# Patient Record
Sex: MALE | Race: WHITE | Employment: FULL TIME | URBAN - METROPOLITAN AREA
[De-identification: names, ages, dates, MRNs, and addresses within clinical notes are randomized per-mention and may not be internally consistent; named-entity substitution may affect disease eponyms.]

---

## 2017-12-12 LAB
CREATININE, EXTERNAL: 0.94
HBA1C MFR BLD HPLC: 5.1 %
LDL-C, EXTERNAL: 156

## 2018-02-01 ENCOUNTER — OFFICE VISIT (OUTPATIENT)
Dept: INTERNAL MEDICINE CLINIC | Age: 53
End: 2018-02-01

## 2018-02-01 VITALS
DIASTOLIC BLOOD PRESSURE: 74 MMHG | RESPIRATION RATE: 12 BRPM | BODY MASS INDEX: 26.94 KG/M2 | WEIGHT: 188.2 LBS | HEIGHT: 70 IN | HEART RATE: 60 BPM | TEMPERATURE: 98.5 F | OXYGEN SATURATION: 98 % | SYSTOLIC BLOOD PRESSURE: 138 MMHG

## 2018-02-01 DIAGNOSIS — E78.2 MIXED HYPERLIPIDEMIA: ICD-10-CM

## 2018-02-01 DIAGNOSIS — J30.1 CHRONIC SEASONAL ALLERGIC RHINITIS DUE TO POLLEN: ICD-10-CM

## 2018-02-01 DIAGNOSIS — Z00.00 ROUTINE GENERAL MEDICAL EXAMINATION AT A HEALTH CARE FACILITY: Primary | ICD-10-CM

## 2018-02-01 RX ORDER — LANOLIN ALCOHOL/MO/W.PET/CERES
400 CREAM (GRAM) TOPICAL DAILY
COMMUNITY
Start: 2018-02-01 | End: 2019-12-06 | Stop reason: ALTCHOICE

## 2018-02-01 RX ORDER — SIMVASTATIN 20 MG/1
20 TABLET, FILM COATED ORAL
Qty: 90 TAB | Refills: 3 | Status: SHIPPED | OUTPATIENT
Start: 2018-02-01 | End: 2019-01-22 | Stop reason: SDUPTHER

## 2018-02-01 NOTE — MR AVS SNAPSHOT
[address] 
[phone] Patient: Tonya Conway MRN:  [id number] Visit Information Date & Time Provider Department Dept. Phone Encounter #  
 2018  2:30 PM Abundio Chinchilla MD [facility] [phone] [id number] Follow-up Instructions Return in about 6 months (around 2018). Upcoming Health Maintenance Date Due Hepatitis C Screening 1965 DTaP/Tdap/Td series (2 - Td) 2019 COLONOSCOPY 2024 Allergies as of 2018  Review Complete On: 2016 By: Abundio Chinchilla MD  
 No Known Allergies Current Immunizations  Reviewed on 2018 Name Date Influenza Vaccine 10/1/2017 TDAP Vaccine 2009 Reviewed by Abundio Chinchilla MD on 2018 at  3:11 PM  
You Were Diagnosed With   
  
 Codes Comments Routine general medical examination at a health care facility    -  Primary ICD-10-CM: Z00.00 ICD-9-CM: V70.0 Mixed hyperlipidemia     ICD-10-CM: E78.2 ICD-9-CM: 272.2 Chronic seasonal allergic rhinitis due to pollen     ICD-10-CM: J30.1 ICD-9-CM: 477.0 Vitals BP Pulse Temp Resp Height(growth percentile) Weight(growth percentile) 138/74 60 98.5 °F (36.9 °C) (Oral) 12 5' 10\" (1.778 m) 188 lb 3.2 oz (85.4 kg) SpO2 BMI Smoking Status 98% 27 kg/m2 Never Smoker Vitals History BMI and BSA Data Body Mass Index Body Surface Area  
 27 kg/m 2 2.05 m 2 Preferred Pharmacy Pharmacy Name Phone [id number] [address] [phone] Your Updated Medication List  
  
   
This list is accurate as of: 18  3:29 PM.  Always use your most recent med list.  
  
  
  
  
 ibuprofen 200 mg tablet Commonly known as:  MOTRIN Take  by mouth every six (6) hours as needed. magnesium oxide 400 mg tablet Commonly known as:  MAG-OX Take 1 Tab by mouth daily. simvastatin 20 mg tablet Commonly known as:  ZOCOR Take 1 Tab by mouth nightly. varicella-zoster recombinant (PF) 50 mcg/0.5 mL Susr injection Commonly known as:  SHINGRIX  
0.5 mL by IntraMUSCular route once for 1 dose. ZyrTEC 10 mg tablet Generic drug:  cetirizine Take  by mouth daily as needed. Prescriptions Printed Refills  
 varicella-zoster recombinant, PF, (SHINGRIX) 50 mcg/0.5 mL susr injection 1 Si.5 mL by IntraMUSCular route once for 1 dose. Class: Print Route: IntraMUSCular Prescriptions Sent to Pharmacy Refills  
 simvastatin (ZOCOR) 20 mg tablet 3 Sig: Take 1 Tab by mouth nightly. Class: Normal  
 Pharmacy: [id number], [address] [place], [place] [address]nicholas  #: [phone] Route: Oral  
  
We Performed the Following PSA TOTAL (REFLEX TO FREE) [90865 CPT(R)] UA/M W/RFLX CULTURE, ROUTINE [[id number] Custom] Follow-up Instructions Return in about 6 months (around 2018). Patient Instructions DASH Diet: Care Instructions Your Care Instructions The DASH diet is an eating plan that can help lower your blood pressure. DASH stands for Dietary Approaches to Stop Hypertension. Hypertension is high blood pressure. The DASH diet focuses on eating foods that are high in calcium, potassium, and magnesium. These nutrients can lower blood pressure. The foods that are highest in these nutrients are fruits, vegetables, low-fat dairy products, nuts, seeds, and legumes. But taking calcium, potassium, and magnesium supplements instead of eating foods that are high in those nutrients does not have the same effect. The DASH diet also includes whole grains, fish, and poultry.  
The DASH diet is one of several lifestyle changes your doctor may recommend to lower your high blood pressure. Your doctor may also want you to decrease the amount of sodium in your diet. Lowering sodium while following the DASH diet can lower blood pressure even further than just the DASH diet alone. Follow-up care is a key part of your treatment and safety. Be sure to make and go to all appointments, and call your doctor if you are having problems. It's also a good idea to know your test results and keep a list of the medicines you take. How can you care for yourself at home? Following the DASH diet · Eat 4 to 5 servings of fruit each day. A serving is 1 medium-sized piece of fruit, ½ cup chopped or canned fruit, 1/4 cup dried fruit, or 4 ounces (½ cup) of fruit juice. Choose fruit more often than fruit juice. · Eat 4 to 5 servings of vegetables each day. A serving is 1 cup of lettuce or raw leafy vegetables, ½ cup of chopped or cooked vegetables, or 4 ounces (½ cup) of vegetable juice. Choose vegetables more often than vegetable juice. · Get 2 to 3 servings of low-fat and fat-free dairy each day. A serving is 8 ounces of milk, 1 cup of yogurt, or 1 ½ ounces of cheese. · Eat 6 to 8 servings of grains each day. A serving is 1 slice of bread, 1 ounce of dry cereal, or ½ cup of cooked rice, pasta, or cooked cereal. Try to choose whole-grain products as much as possible. · Limit lean meat, poultry, and fish to 2 servings each day. A serving is 3 ounces, about the size of a deck of cards. · Eat 4 to 5 servings of nuts, seeds, and legumes (cooked dried beans, lentils, and split peas) each week. A serving is 1/3 cup of nuts, 2 tablespoons of seeds, or ½ cup of cooked beans or peas. · Limit fats and oils to 2 to 3 servings each day. A serving is 1 teaspoon of vegetable oil or 2 tablespoons of salad dressing. · Limit sweets and added sugars to 5 servings or less a week. A serving is 1 tablespoon jelly or jam, ½ cup sorbet, or 1 cup of lemonade. · Eat less than 2,300 milligrams (mg) of sodium a day. If you limit your sodium to 1,500 mg a day, you can lower your blood pressure even more. Tips for success · Start small. Do not try to make dramatic changes to your diet all at once. You might feel that you are missing out on your favorite foods and then be more likely to not follow the plan. Make small changes, and stick with them. Once those changes become habit, add a few more changes. · Try some of the following: ¨ Make it a goal to eat a fruit or vegetable at every meal and at snacks. This will make it easy to get the recommended amount of fruits and vegetables each day. ¨ Try yogurt topped with fruit and nuts for a snack or healthy dessert. ¨ Add lettuce, tomato, cucumber, and onion to sandwiches. ¨ Combine a ready-made pizza crust with low-fat mozzarella cheese and lots of vegetable toppings. Try using tomatoes, squash, spinach, broccoli, carrots, cauliflower, and onions. ¨ Have a variety of cut-up vegetables with a low-fat dip as an appetizer instead of chips and dip. ¨ Sprinkle sunflower seeds or chopped almonds over salads. Or try adding chopped walnuts or almonds to cooked vegetables. ¨ Try some vegetarian meals using beans and peas. Add garbanzo or kidney beans to salads. Make burritos and tacos with mashed allen beans or black beans. Where can you learn more? Go to http://melanie-jonathan.info/. Enter W457 in the search box to learn more about \"DASH Diet: Care Instructions. \" Current as of: September 21, 2016 Content Version: 11.4 © 9844-2894 [employer]. Care instructions adapted under license by [employer] (which disclaims liability or warranty for this information). If you have questions about a medical condition or this instruction, always ask your healthcare professional. [address] any warranty or liability for your use of this information. Piriformis Syndrome: Exercises Your Care Instructions Here are some examples of typical rehabilitation exercises for your condition. Start each exercise slowly. Ease off the exercise if you start to have pain. Your doctor or physical therapist will tell you when you can start these exercises and which ones will work best for you. How to do the exercises Hip rotator stretch 1. Lie on your back with both knees bent and your feet flat on the floor. 2. Put the ankle of your affected leg on your opposite thigh near your knee. 3. Use your hand to gently push your knee (on your affected leg) away from your body until you feel a gentle stretch around your hip. 4. Hold the stretch for 15 to 30 seconds. 5. Repeat 2 to 4 times. 6. Switch legs and repeat steps 1 through 5. Piriformis stretch 1. Lie on your back with your legs straight. 2. Lift your affected leg and bend your knee. With your opposite hand, reach across your body, and then gently pull your knee toward your opposite shoulder. 3. Hold the stretch for 15 to 30 seconds. 4. Repeat with your other leg. 5. Repeat 2 to 4 times on each side. Lower abdominal strengthening 1. Lie on your back with your knees bent and your feet flat on the floor. 2. Tighten your belly muscles by pulling your belly button in toward your spine. 3. Lift one foot off the floor and bring your knee toward your chest, so that your knee is straight above your hip and your leg is bent like the letter \"L. \" 
4. Lift the other knee up to the same position. 5. Lower one leg at a time to the starting position. 6. Keep alternating legs until you have lifted each leg 8 to 12 times. 7. Be sure to keep your belly muscles tight and your back still as you are moving your legs. Be sure to breathe normally. Follow-up care is a key part of your treatment and safety.  Be sure to make and go to all appointments, and call your doctor if you are having problems. It's also a good idea to know your test results and keep a list of the medicines you take. Where can you learn more? Go to http://melanie-jonathan.info/. Enter N624 in the search box to learn more about \"Piriformis Syndrome: Exercises. \" Current as of: March 21, 2017 Content Version: 11.4 © 5589-5228 [employer]. Care instructions adapted under license by [employer] (which disclaims liability or warranty for this information). If you have questions about a medical condition or this instruction, always ask your healthcare professional. [address] any warranty or liability for your use of this information. Introducing [facility]! Dear Toma Rodriguez: Thank you for requesting a The Hunt account. Our records indicate that you already have an active The Hunt account. You can access your account anytime at [email or web address] Did you know that you can access your hospital and ER discharge instructions at any time in The Hunt? You can also review all of your test results from your hospital stay or ER visit. Additional Information If you have questions, please visit the Frequently Asked Questions section of the The Hunt website at [email or web address] Remember, The Hunt is NOT to be used for urgent needs. For medical emergencies, dial 911. Now available from your iPhone and Android! Please provide this summary of care documentation to your next provider. Your primary care clinician is listed as Richelle [phone] If you have any questions after today's visit, please call [phone].

## 2018-02-01 NOTE — PATIENT INSTRUCTIONS
DASH Diet: Care Instructions  Your Care Instructions    The DASH diet is an eating plan that can help lower your blood pressure. DASH stands for Dietary Approaches to Stop Hypertension. Hypertension is high blood pressure. The DASH diet focuses on eating foods that are high in calcium, potassium, and magnesium. These nutrients can lower blood pressure. The foods that are highest in these nutrients are fruits, vegetables, low-fat dairy products, nuts, seeds, and legumes. But taking calcium, potassium, and magnesium supplements instead of eating foods that are high in those nutrients does not have the same effect. The DASH diet also includes whole grains, fish, and poultry. The DASH diet is one of several lifestyle changes your doctor may recommend to lower your high blood pressure. Your doctor may also want you to decrease the amount of sodium in your diet. Lowering sodium while following the DASH diet can lower blood pressure even further than just the DASH diet alone. Follow-up care is a key part of your treatment and safety. Be sure to make and go to all appointments, and call your doctor if you are having problems. It's also a good idea to know your test results and keep a list of the medicines you take. How can you care for yourself at home? Following the DASH diet  · Eat 4 to 5 servings of fruit each day. A serving is 1 medium-sized piece of fruit, ½ cup chopped or canned fruit, 1/4 cup dried fruit, or 4 ounces (½ cup) of fruit juice. Choose fruit more often than fruit juice. · Eat 4 to 5 servings of vegetables each day. A serving is 1 cup of lettuce or raw leafy vegetables, ½ cup of chopped or cooked vegetables, or 4 ounces (½ cup) of vegetable juice. Choose vegetables more often than vegetable juice. · Get 2 to 3 servings of low-fat and fat-free dairy each day. A serving is 8 ounces of milk, 1 cup of yogurt, or 1 ½ ounces of cheese. · Eat 6 to 8 servings of grains each day.  A serving is 1 slice of bread, 1 ounce of dry cereal, or ½ cup of cooked rice, pasta, or cooked cereal. Try to choose whole-grain products as much as possible. · Limit lean meat, poultry, and fish to 2 servings each day. A serving is 3 ounces, about the size of a deck of cards. · Eat 4 to 5 servings of nuts, seeds, and legumes (cooked dried beans, lentils, and split peas) each week. A serving is 1/3 cup of nuts, 2 tablespoons of seeds, or ½ cup of cooked beans or peas. · Limit fats and oils to 2 to 3 servings each day. A serving is 1 teaspoon of vegetable oil or 2 tablespoons of salad dressing. · Limit sweets and added sugars to 5 servings or less a week. A serving is 1 tablespoon jelly or jam, ½ cup sorbet, or 1 cup of lemonade. · Eat less than 2,300 milligrams (mg) of sodium a day. If you limit your sodium to 1,500 mg a day, you can lower your blood pressure even more. Tips for success  · Start small. Do not try to make dramatic changes to your diet all at once. You might feel that you are missing out on your favorite foods and then be more likely to not follow the plan. Make small changes, and stick with them. Once those changes become habit, add a few more changes. · Try some of the following:  ¨ Make it a goal to eat a fruit or vegetable at every meal and at snacks. This will make it easy to get the recommended amount of fruits and vegetables each day. ¨ Try yogurt topped with fruit and nuts for a snack or healthy dessert. ¨ Add lettuce, tomato, cucumber, and onion to sandwiches. ¨ Combine a ready-made pizza crust with low-fat mozzarella cheese and lots of vegetable toppings. Try using tomatoes, squash, spinach, broccoli, carrots, cauliflower, and onions. ¨ Have a variety of cut-up vegetables with a low-fat dip as an appetizer instead of chips and dip. ¨ Sprinkle sunflower seeds or chopped almonds over salads. Or try adding chopped walnuts or almonds to cooked vegetables.   ¨ Try some vegetarian meals using beans and peas. Add garbanzo or kidney beans to salads. Make burritos and tacos with mashed allen beans or black beans. Where can you learn more? Go to http://melanie-jonathan.info/. Enter Y734 in the search box to learn more about \"DASH Diet: Care Instructions. \"  Current as of: September 21, 2016  Content Version: 11.4  © 6863-9652 [employer]. Care instructions adapted under license by [employer] (which disclaims liability or warranty for this information). If you have questions about a medical condition or this instruction, always ask your healthcare professional. [place] [address] any warranty or liability for your use of this information. Piriformis Syndrome: Exercises  Your Care Instructions  Here are some examples of typical rehabilitation exercises for your condition. Start each exercise slowly. Ease off the exercise if you start to have pain. Your doctor or physical therapist will tell you when you can start these exercises and which ones will work best for you. How to do the exercises  Hip rotator stretch    1. Lie on your back with both knees bent and your feet flat on the floor. 2. Put the ankle of your affected leg on your opposite thigh near your knee. 3. Use your hand to gently push your knee (on your affected leg) away from your body until you feel a gentle stretch around your hip. 4. Hold the stretch for 15 to 30 seconds. 5. Repeat 2 to 4 times. 6. Switch legs and repeat steps 1 through 5. Piriformis stretch    1. Lie on your back with your legs straight. 2. Lift your affected leg and bend your knee. With your opposite hand, reach across your body, and then gently pull your knee toward your opposite shoulder. 3. Hold the stretch for 15 to 30 seconds. 4. Repeat with your other leg. 5. Repeat 2 to 4 times on each side. Lower abdominal strengthening    1.  Lie on your back with your knees bent and your feet flat on the floor.  2. Tighten your belly muscles by pulling your belly button in toward your spine. 3. Lift one foot off the floor and bring your knee toward your chest, so that your knee is straight above your hip and your leg is bent like the letter \"L. \"  4. Lift the other knee up to the same position. 5. Lower one leg at a time to the starting position. 6. Keep alternating legs until you have lifted each leg 8 to 12 times. 7. Be sure to keep your belly muscles tight and your back still as you are moving your legs. Be sure to breathe normally. Follow-up care is a key part of your treatment and safety. Be sure to make and go to all appointments, and call your doctor if you are having problems. It's also a good idea to know your test results and keep a list of the medicines you take. Where can you learn more? Go to http://melanie-jonathan.info/. Enter T539 in the search box to learn more about \"Piriformis Syndrome: Exercises. \"  Current as of: March 21, 2017  Content Version: 11.4  © 3446-9099 [employer]. Care instructions adapted under license by [employer] (which disclaims liability or warranty for this information). If you have questions about a medical condition or this instruction, always ask your healthcare professional. [address] any warranty or liability for your use of this information.

## 2018-02-02 LAB
APPEARANCE UR: CLEAR
BACTERIA #/AREA URNS HPF: NORMAL /[HPF]
BILIRUB UR QL STRIP: NEGATIVE
CASTS URNS QL MICRO: NORMAL /LPF
COLOR UR: YELLOW
EPI CELLS #/AREA URNS HPF: NORMAL /HPF
GLUCOSE UR QL: NEGATIVE
HGB UR QL STRIP: NEGATIVE
KETONES UR QL STRIP: NEGATIVE
LEUKOCYTE ESTERASE UR QL STRIP: NEGATIVE
MICRO URNS: NORMAL
MICRO URNS: NORMAL
MUCOUS THREADS URNS QL MICRO: PRESENT
NITRITE UR QL STRIP: NEGATIVE
PH UR STRIP: 6.5 [PH] (ref 5–7.5)
PROT UR QL STRIP: NEGATIVE
PSA SERPL-MCNC: 3.6 NG/ML (ref 0–4)
RBC #/AREA URNS HPF: NORMAL /HPF
SP GR UR: 1.02 (ref 1–1.03)
URINALYSIS REFLEX, 377202: NORMAL
UROBILINOGEN UR STRIP-MCNC: 0.2 MG/DL (ref 0.2–1)
WBC #/AREA URNS HPF: NORMAL /HPF

## 2018-02-02 NOTE — PROGRESS NOTES
Subjective:     Vick Hernandez is a 45 y.o. male presenting for annual exam and complete physical.    Patient Active Problem List    Diagnosis Date Noted    Advance directive discussed with patient 05/26/2016    Mixed hyperlipidemia 01/25/2010    Allergic rhinitis due to pollen      Current Outpatient Prescriptions   Medication Sig Dispense Refill    magnesium oxide (MAG-OX) 400 mg tablet Take 1 Tab by mouth daily.  varicella-zoster recombinant, PF, (SHINGRIX) 50 mcg/0.5 mL susr injection 0.5 mL by IntraMUSCular route once for 1 dose. 0.5 mL 1    simvastatin (ZOCOR) 20 mg tablet Take 1 Tab by mouth nightly. 90 Tab 3    cetirizine (ZYRTEC) 10 mg tablet Take  by mouth daily as needed.  ibuprofen (MOTRIN) 200 mg tablet Take  by mouth every six (6) hours as needed.        No Known Allergies  Past Medical History:   Diagnosis Date    Allergic rhinitis, cause unspecified     Hives     Mixed hyperlipidemia 1/25/2010     Past Surgical History:   Procedure Laterality Date    HX HEENT      HX VASECTOMY      HX WISDOM TEETH EXTRACTION       Family History   Problem Relation Age of Onset    Elevated Lipids Father     Cancer Paternal Grandmother      brain    Heart Disease Paternal Grandfather     Alcohol abuse Brother     Stroke Brother     Seizures Brother     Alcohol abuse Sister     Other Sister      Social History   Substance Use Topics    Smoking status: Never Smoker    Smokeless tobacco: Never Used    Alcohol use 1.8 oz/week     3 Glasses of wine per week        Lab Results   Component Value Date/Time    WBC 5.9 05/26/2016 09:26 AM    HGB 15.6 05/26/2016 09:26 AM    HCT 46.7 05/26/2016 09:26 AM    PLATELET 731 24/02/3296 09:26 AM    MCV 92 05/26/2016 09:26 AM     Lab Results   Component Value Date/Time    Cholesterol, total 212 05/26/2016 09:26 AM    HDL Cholesterol 55 05/26/2016 09:26 AM    LDL, calculated 145 05/26/2016 09:26 AM    LDL-C, External 156 12/12/2017    Triglyceride 58 05/26/2016 09:26 AM    CHOL/HDL Ratio 3.0 01/25/2010 09:54 AM     Lab Results   Component Value Date/Time    ALT (SGPT) 23 05/26/2016 09:26 AM    AST (SGOT) 18 05/26/2016 09:26 AM    Alk. phosphatase 38 05/26/2016 09:26 AM    Bilirubin, total 1.3 05/26/2016 09:26 AM    Albumin 4.2 05/26/2016 09:26 AM    Protein, total 6.6 05/26/2016 09:26 AM    PLATELET 155 80/98/0221 09:26 AM       Lab Results   Component Value Date/Time    GFR est non-AA 79 05/26/2016 09:26 AM    GFR est AA 92 05/26/2016 09:26 AM    Creatinine 1.07 05/26/2016 09:26 AM    BUN 16 05/26/2016 09:26 AM    Sodium 139 05/26/2016 09:26 AM    Potassium 4.8 05/26/2016 09:26 AM    Chloride 100 05/26/2016 09:26 AM    CO2 25 05/26/2016 09:26 AM     Lab Results   Component Value Date/Time    Prostate Specific Ag 3.3 05/26/2016 09:26 AM    Prostate Specific Ag 2.1 05/21/2015 09:13 AM    Prostate Specific Ag 1.9 05/09/2014 09:02 AM    Prostate Specific Ag 1.2 01/25/2010 09:54 AM    Prostate Specific Ag 0.9 01/23/2009 10:47 AM     Lab Results   Component Value Date/Time    TSH 0.967 05/26/2016 09:26 AM      Lab Results   Component Value Date/Time    Glucose 85 05/26/2016 09:26 AM         Review of Systems  A comprehensive review of systems was negative except for: Piriformis syndrome pain in the right buttocks intermittently.     Objective:     Visit Vitals    /74    Pulse 60    Temp 98.5 °F (36.9 °C) (Oral)    Resp 12    Ht 5' 10\" (1.778 m)    Wt 188 lb 3.2 oz (85.4 kg)    SpO2 98%    BMI 27 kg/m2     Physical exam:   General appearance - alert, well appearing, and in no distress  Eyes - pupils equal and reactive, extraocular eye movements intact  Ears - bilateral TM's and external ear canals normal  Nose - normal and patent, no erythema, discharge or polyps  Mouth - mucous membranes moist, pharynx normal without lesions  Neck - supple, no significant adenopathy  Lymphatics - no palpable lymphadenopathy, no hepatosplenomegaly  Chest - clear to auscultation, no wheezes, rales or rhonchi, symmetric air entry  Heart - normal rate, regular rhythm, normal S1, S2, no murmurs, rubs, clicks or gallops  Abdomen - soft, nontender, nondistended, no masses or organomegaly  Rectal - deferred, not clinically indicated  Back exam - full range of motion, no tenderness, palpable spasm or pain on motion  Neurological - alert, oriented, normal speech, no focal findings or movement disorder noted  Musculoskeletal - no joint tenderness, deformity or swelling  Extremities - peripheral pulses normal, no pedal edema, no clubbing or cyanosis     Assessment/Plan:       lose weight, routine labs ordered. Diagnoses and all orders for this visit:    1. Routine general medical examination at a health care facility  -     PROSTATE SPECIFIC AG  -     UA/M W/RFLX CULTURE, ROUTINE    2. Mixed hyperlipidemia recent labs revealed an LDL cholesterol of 155. A risk calculator was done with him today and he has more than 6% 10 year risk of vascular event. For this reason he is felt to be in candidate for statin therapy. Will restart his meds and recheck lab work in 6 months. -     PROSTATE SPECIFIC AG  -     UA/M W/RFLX CULTURE, ROUTINE    3. Chronic seasonal allergic rhinitis due to pollen  -     PROSTATE SPECIFIC AG  -     UA/M W/RFLX CULTURE, ROUTINE    Other orders  -     varicella-zoster recombinant, PF, (SHINGRIX) 50 mcg/0.5 mL susr injection; 0.5 mL by IntraMUSCular route once for 1 dose. -     simvastatin (ZOCOR) 20 mg tablet; Take 1 Tab by mouth nightly. Follow-up Disposition:  Return in about 6 months (around 8/1/2018). Advised him to call back or return to office if symptoms worsen/change/persist.  Discussed expected course/resolution/complications of diagnosis in detail with patient. Medication risks/benefits/costs/interactions/alternatives discussed with patient. He was given an after visit summary which includes diagnoses, current medications, & vitals.   He expressed understanding with the diagnosis and plan. Donis Hayes

## 2019-01-22 RX ORDER — SIMVASTATIN 20 MG/1
TABLET, FILM COATED ORAL
Qty: 90 TAB | Refills: 3 | Status: SHIPPED | OUTPATIENT
Start: 2019-01-22 | End: 2020-02-02

## 2019-02-07 ENCOUNTER — OFFICE VISIT (OUTPATIENT)
Dept: INTERNAL MEDICINE CLINIC | Age: 54
End: 2019-02-07

## 2019-02-07 VITALS
OXYGEN SATURATION: 99 % | SYSTOLIC BLOOD PRESSURE: 131 MMHG | HEART RATE: 57 BPM | RESPIRATION RATE: 14 BRPM | BODY MASS INDEX: 24.34 KG/M2 | HEIGHT: 70 IN | DIASTOLIC BLOOD PRESSURE: 73 MMHG | WEIGHT: 170 LBS | TEMPERATURE: 98.3 F

## 2019-02-07 DIAGNOSIS — E78.2 MIXED HYPERLIPIDEMIA: ICD-10-CM

## 2019-02-07 DIAGNOSIS — Z23 ENCOUNTER FOR IMMUNIZATION: ICD-10-CM

## 2019-02-07 DIAGNOSIS — Z12.5 PROSTATE CANCER SCREENING: ICD-10-CM

## 2019-02-07 DIAGNOSIS — Z00.00 ROUTINE GENERAL MEDICAL EXAMINATION AT A HEALTH CARE FACILITY: Primary | ICD-10-CM

## 2019-02-07 DIAGNOSIS — J30.1 SEASONAL ALLERGIC RHINITIS DUE TO POLLEN: ICD-10-CM

## 2019-02-07 NOTE — PROCEDURES
Vinayak Villasenor  is a 52 y.o.  male  who present for routine immunizations. He  denies any symptoms , reactions or allergies that would exclude them from being immunized today. Risks and adverse reactions were discussed and the VIS was given to them. All questions were addressed. She was observed for 5 min post injection. There were no reactions observed.     Telma Kent LPN

## 2019-02-07 NOTE — PROGRESS NOTES
Subjective:     Olivia Kahn is a 52 y.o. male presenting for annual exam and complete physical.    Patient Active Problem List    Diagnosis Date Noted    Advance directive discussed with patient 05/26/2016    Mixed hyperlipidemia 01/25/2010    Allergic rhinitis due to pollen      Current Outpatient Medications   Medication Sig Dispense Refill    simvastatin (ZOCOR) 20 mg tablet TAKE 1 TABLET BY MOUTH AT NIGHTLY 90 Tab 3    magnesium oxide (MAG-OX) 400 mg tablet Take 1 Tab by mouth daily.  cetirizine (ZYRTEC) 10 mg tablet Take  by mouth daily as needed.  ibuprofen (MOTRIN) 200 mg tablet Take  by mouth every six (6) hours as needed. No Known Allergies  Past Medical History:   Diagnosis Date    Allergic rhinitis, cause unspecified     Hives     Mixed hyperlipidemia 1/25/2010     Past Surgical History:   Procedure Laterality Date    HX HEENT      HX VASECTOMY      HX WISDOM TEETH EXTRACTION       Family History   Problem Relation Age of Onset    Elevated Lipids Father     Cancer Paternal Grandmother         brain    Heart Disease Paternal Grandfather     Alcohol abuse Brother     Stroke Brother     Seizures Brother     Alcohol abuse Sister     Other Sister      Social History     Tobacco Use    Smoking status: Never Smoker    Smokeless tobacco: Never Used   Substance Use Topics    Alcohol use:  Yes     Alcohol/week: 1.8 oz     Types: 3 Glasses of wine per week     Frequency: 2-3 times a week     Drinks per session: 1 or 2     Binge frequency: Never        Lab Results   Component Value Date/Time    WBC 5.9 05/26/2016 09:26 AM    HGB 15.6 05/26/2016 09:26 AM    HCT 46.7 05/26/2016 09:26 AM    PLATELET 953 45/58/5938 09:26 AM    MCV 92 05/26/2016 09:26 AM     Lab Results   Component Value Date/Time    Cholesterol, total 212 (H) 05/26/2016 09:26 AM    HDL Cholesterol 55 05/26/2016 09:26 AM    LDL, calculated 145 (H) 05/26/2016 09:26 AM    LDL-C, External 98 08/25/2018 Triglyceride 58 05/26/2016 09:26 AM    CHOL/HDL Ratio 3.0 01/25/2010 09:54 AM     Lab Results   Component Value Date/Time    ALT (SGPT) 23 05/26/2016 09:26 AM    AST (SGOT) 18 05/26/2016 09:26 AM    Alk. phosphatase 38 (L) 05/26/2016 09:26 AM    Bilirubin, total 1.3 (H) 05/26/2016 09:26 AM    Albumin 4.2 05/26/2016 09:26 AM    Protein, total 6.6 05/26/2016 09:26 AM    PLATELET 428 91/46/0198 09:26 AM     Lab Results   Component Value Date/Time    GFR est non-AA 79 05/26/2016 09:26 AM    GFR est AA 92 05/26/2016 09:26 AM    Creatinine 1.07 05/26/2016 09:26 AM    BUN 16 05/26/2016 09:26 AM    Sodium 139 05/26/2016 09:26 AM    Potassium 4.8 05/26/2016 09:26 AM    Chloride 100 05/26/2016 09:26 AM    CO2 25 05/26/2016 09:26 AM     Lab Results   Component Value Date/Time    Prostate Specific Ag 3.6 02/01/2018 03:43 PM    Prostate Specific Ag 3.3 05/26/2016 09:26 AM    Prostate Specific Ag 2.1 05/21/2015 09:13 AM    Prostate Specific Ag 1.2 01/25/2010 09:54 AM    Prostate Specific Ag 0.9 01/23/2009 10:47 AM     Lab Results   Component Value Date/Time    TSH 0.967 05/26/2016 09:26 AM      Lab Results   Component Value Date/Time    Glucose 85 05/26/2016 09:26 AM         Review of Systems  A comprehensive review of systems was negative except for: Weight is down intentionally. He is exercising multiple times per week. Does have some occasional knee pain.     Objective:     Visit Vitals  /73   Pulse (!) 57   Temp 98.3 °F (36.8 °C) (Oral)   Resp 14   Ht 5' 10\" (1.778 m)   Wt 170 lb (77.1 kg)   SpO2 99%   BMI 24.39 kg/m²     Physical exam:   General appearance - alert, well appearing, and in no distress  Eyes - pupils equal and reactive, extraocular eye movements intact  Ears - bilateral TM's and external ear canals normal  Nose - normal and patent, no erythema, discharge or polyps  Mouth - mucous membranes moist, pharynx normal without lesions  Neck - supple, no significant adenopathy  Lymphatics - no palpable lymphadenopathy, no hepatosplenomegaly  Chest - clear to auscultation, no wheezes, rales or rhonchi, symmetric air entry  Heart - normal rate, regular rhythm, normal S1, S2, no murmurs, rubs, clicks or gallops  Abdomen - soft, nontender, nondistended, no masses or organomegaly  Back exam - full range of motion, no tenderness, palpable spasm or pain on motion  Neurological - alert, oriented, normal speech, no focal findings or movement disorder noted  Musculoskeletal - no joint tenderness, deformity or swelling  Extremities - peripheral pulses normal, no pedal edema, no clubbing or cyanosis     Assessment/Plan:       continue present plan, routine labs ordered. Diagnoses and all orders for this visit:    1. Routine general medical examination at a health care facility  -     CBC WITH AUTOMATED DIFF  -     METABOLIC PANEL, COMPREHENSIVE  -     LIPID PANEL  -     TSH RFX ON ABNORMAL TO FREE T4  -     PSA, DIAGNOSTIC (PROSTATE SPECIFIC AG)  -     HEPATITIS C AB    2. Mixed hyperlipidemia-we will check labs with an LDL goal of 100. He is tolerating statins well. 3. Seasonal allergic rhinitis due to pollen-stable. 4. Prostate cancer screening -some occasional nocturia. We will continue to monitor PSA and symptoms. Discussed medications and he will defer for now. 5. Encounter for immunization  -     TETANUS, DIPHTHERIA TOXOIDS AND ACELLULAR PERTUSSIS VACCINE (TDAP), IN INDIVIDS. >=7, IM       Follow-up Disposition:  Return in about 1 year (around 2/7/2020). Advised him to call back or return to office if symptoms worsen/change/persist.  Discussed expected course/resolution/complications of diagnosis in detail with patient. Medication risks/benefits/costs/interactions/alternatives discussed with patient. He was given an after visit summary which includes diagnoses, current medications, & vitals. He expressed understanding with the diagnosis and plan. Holden Gentile

## 2019-02-08 LAB
ALBUMIN SERPL-MCNC: 4.7 G/DL (ref 3.5–5.5)
ALBUMIN/GLOB SERPL: 2 {RATIO} (ref 1.2–2.2)
ALP SERPL-CCNC: 38 IU/L (ref 39–117)
ALT SERPL-CCNC: 21 IU/L (ref 0–44)
AST SERPL-CCNC: 18 IU/L (ref 0–40)
BASOPHILS # BLD AUTO: 0 X10E3/UL (ref 0–0.2)
BASOPHILS NFR BLD AUTO: 1 %
BILIRUB SERPL-MCNC: 1.7 MG/DL (ref 0–1.2)
BUN SERPL-MCNC: 17 MG/DL (ref 6–24)
BUN/CREAT SERPL: 17 (ref 9–20)
CALCIUM SERPL-MCNC: 9.6 MG/DL (ref 8.7–10.2)
CHLORIDE SERPL-SCNC: 100 MMOL/L (ref 96–106)
CHOLEST SERPL-MCNC: 172 MG/DL (ref 100–199)
CO2 SERPL-SCNC: 21 MMOL/L (ref 20–29)
CREAT SERPL-MCNC: 1 MG/DL (ref 0.76–1.27)
EOSINOPHIL # BLD AUTO: 0 X10E3/UL (ref 0–0.4)
EOSINOPHIL NFR BLD AUTO: 1 %
ERYTHROCYTE [DISTWIDTH] IN BLOOD BY AUTOMATED COUNT: 13.8 % (ref 12.3–15.4)
GLOBULIN SER CALC-MCNC: 2.3 G/DL (ref 1.5–4.5)
GLUCOSE SERPL-MCNC: 87 MG/DL (ref 65–99)
HCT VFR BLD AUTO: 45.1 % (ref 37.5–51)
HCV AB S/CO SERPL IA: <0.1 S/CO RATIO (ref 0–0.9)
HDLC SERPL-MCNC: 64 MG/DL
HGB BLD-MCNC: 15.1 G/DL (ref 13–17.7)
IMM GRANULOCYTES # BLD AUTO: 0 X10E3/UL (ref 0–0.1)
IMM GRANULOCYTES NFR BLD AUTO: 0 %
LDLC SERPL CALC-MCNC: 99 MG/DL (ref 0–99)
LYMPHOCYTES # BLD AUTO: 1.5 X10E3/UL (ref 0.7–3.1)
LYMPHOCYTES NFR BLD AUTO: 23 %
MCH RBC QN AUTO: 30.7 PG (ref 26.6–33)
MCHC RBC AUTO-ENTMCNC: 33.5 G/DL (ref 31.5–35.7)
MCV RBC AUTO: 92 FL (ref 79–97)
MONOCYTES # BLD AUTO: 0.5 X10E3/UL (ref 0.1–0.9)
MONOCYTES NFR BLD AUTO: 8 %
NEUTROPHILS # BLD AUTO: 4.5 X10E3/UL (ref 1.4–7)
NEUTROPHILS NFR BLD AUTO: 67 %
PLATELET # BLD AUTO: 248 X10E3/UL (ref 150–379)
POTASSIUM SERPL-SCNC: 4.9 MMOL/L (ref 3.5–5.2)
PROT SERPL-MCNC: 7 G/DL (ref 6–8.5)
PSA SERPL-MCNC: 4.2 NG/ML (ref 0–4)
RBC # BLD AUTO: 4.92 X10E6/UL (ref 4.14–5.8)
SODIUM SERPL-SCNC: 140 MMOL/L (ref 134–144)
TRIGL SERPL-MCNC: 45 MG/DL (ref 0–149)
TSH SERPL DL<=0.005 MIU/L-ACNC: 1.07 UIU/ML (ref 0.45–4.5)
VLDLC SERPL CALC-MCNC: 9 MG/DL (ref 5–40)
WBC # BLD AUTO: 6.6 X10E3/UL (ref 3.4–10.8)

## 2019-12-06 ENCOUNTER — OFFICE VISIT (OUTPATIENT)
Dept: INTERNAL MEDICINE CLINIC | Age: 54
End: 2019-12-06

## 2019-12-06 VITALS
BODY MASS INDEX: 25.48 KG/M2 | OXYGEN SATURATION: 100 % | TEMPERATURE: 97.7 F | DIASTOLIC BLOOD PRESSURE: 71 MMHG | RESPIRATION RATE: 12 BRPM | WEIGHT: 178 LBS | HEART RATE: 56 BPM | SYSTOLIC BLOOD PRESSURE: 116 MMHG | HEIGHT: 70 IN

## 2019-12-06 DIAGNOSIS — G89.29 CHRONIC LEFT SHOULDER PAIN: Primary | ICD-10-CM

## 2019-12-06 DIAGNOSIS — M77.8 LEFT ELBOW TENDONITIS: ICD-10-CM

## 2019-12-06 DIAGNOSIS — M25.512 CHRONIC LEFT SHOULDER PAIN: Primary | ICD-10-CM

## 2019-12-06 NOTE — PROGRESS NOTES
Chief Complaint   Patient presents with    Elbow Pain     left sided x 4-5 months    Shoulder Pain

## 2019-12-06 NOTE — PROGRESS NOTES
HPI:  Sally Boyce is a 52y.o. year old male who is here for a routine visit:    Presents for pain in the left shoulder and the left elbow that is been going on for several months. The shoulder pain seems to occur with certain movements. He also gets pain at the elbow when he is lifting kettle bells. No swelling or redness. No numbness, tingling, or weakness. He has been using some ibuprofen as needed. Past Medical History:   Diagnosis Date    Allergic rhinitis, cause unspecified     Hives     Mixed hyperlipidemia 1/25/2010       Past Surgical History:   Procedure Laterality Date    HX HEENT      HX VASECTOMY      HX WISDOM TEETH EXTRACTION         Prior to Admission medications    Medication Sig Start Date End Date Taking? Authorizing Provider   simvastatin (ZOCOR) 20 mg tablet TAKE 1 TABLET BY MOUTH AT NIGHTLY 1/22/19  Yes Leonardo Sharp III, MD   cetirizine (ZYRTEC) 10 mg tablet Take  by mouth daily as needed. Yes Provider, Historical   ibuprofen (MOTRIN) 200 mg tablet Take  by mouth every six (6) hours as needed. Yes Provider, Historical   magnesium oxide (MAG-OX) 400 mg tablet Take 1 Tab by mouth daily. 2/1/18 12/6/19  Taylor Marroquin MD       Social History     Socioeconomic History    Marital status:      Spouse name: Not on file    Number of children: Not on file    Years of education: Not on file    Highest education level: Not on file   Occupational History    Not on file   Social Needs    Financial resource strain: Not on file    Food insecurity:     Worry: Not on file     Inability: Not on file    Transportation needs:     Medical: Not on file     Non-medical: Not on file   Tobacco Use    Smoking status: Never Smoker    Smokeless tobacco: Never Used   Substance and Sexual Activity    Alcohol use:  Yes     Alcohol/week: 3.0 standard drinks     Types: 3 Glasses of wine per week     Frequency: 2-3 times a week     Drinks per session: 1 or 2     Binge frequency: Never    Drug use: No    Sexual activity: Yes     Partners: Female     Birth control/protection: Surgical   Lifestyle    Physical activity:     Days per week: Not on file     Minutes per session: Not on file    Stress: Not on file   Relationships    Social connections:     Talks on phone: Not on file     Gets together: Not on file     Attends Confucianist service: Not on file     Active member of club or organization: Not on file     Attends meetings of clubs or organizations: Not on file     Relationship status: Not on file    Intimate partner violence:     Fear of current or ex partner: Not on file     Emotionally abused: Not on file     Physically abused: Not on file     Forced sexual activity: Not on file   Other Topics Concern    Not on file   Social History Narrative    Not on file          ROS  Per HPI    Visit Vitals  /71   Pulse (!) 56   Temp 97.7 °F (36.5 °C) (Oral)   Resp 12   Ht 5' 10\" (1.778 m)   Wt 178 lb (80.7 kg)   SpO2 100%   BMI 25.54 kg/m²         Physical Exam   Physical Examination: General appearance - alert, well appearing, and in no distress  Chest - clear to auscultation, no wheezes, rales or rhonchi, symmetric air entry  Heart - normal rate, regular rhythm, normal S1, S2, no murmurs, rubs, clicks or gallops  Neurological - alert, oriented, normal speech, no focal findings or movement disorder noted  Musculoskeletal - abnormal exam of left shoulder with some mild anterior joint line tenderness and minimal crepitus. No effusion. There is some tenderness along the distal biceps tendon insertion at the elbow. Normal range of motion. Assessment/Plan:  1. Shoulder impingement syndrome-will refer for physical therapy. 2.  Biceps tendinitis-refer for physical therapy as well. Follow-up if his symptoms persist for an orthopedic evaluation. Follow-up and Dispositions    · Return if symptoms worsen or fail to improve.             Advised him to call back or return to office if symptoms worsen/change/persist.  Discussed expected course/resolution/complications of diagnosis in detail with patient. Medication risks/benefits/costs/interactions/alternatives discussed with patient. He was given an after visit summary which includes diagnoses, current medications, & vitals. He expressed understanding with the diagnosis and plan.

## 2020-02-02 RX ORDER — SIMVASTATIN 20 MG/1
TABLET, FILM COATED ORAL
Qty: 90 TAB | Refills: 3 | Status: SHIPPED | OUTPATIENT
Start: 2020-02-02 | End: 2020-08-24 | Stop reason: DRUGHIGH

## 2020-08-21 ENCOUNTER — OFFICE VISIT (OUTPATIENT)
Dept: INTERNAL MEDICINE CLINIC | Age: 55
End: 2020-08-21

## 2020-08-21 VITALS
BODY MASS INDEX: 26.92 KG/M2 | SYSTOLIC BLOOD PRESSURE: 128 MMHG | DIASTOLIC BLOOD PRESSURE: 78 MMHG | WEIGHT: 188 LBS | RESPIRATION RATE: 12 BRPM | HEART RATE: 54 BPM | OXYGEN SATURATION: 96 % | TEMPERATURE: 97.8 F | HEIGHT: 70 IN

## 2020-08-21 DIAGNOSIS — M25.512 CHRONIC LEFT SHOULDER PAIN: ICD-10-CM

## 2020-08-21 DIAGNOSIS — Z12.11 SCREEN FOR COLON CANCER: ICD-10-CM

## 2020-08-21 DIAGNOSIS — M72.0 DUPUYTREN'S CONTRACTURE OF LEFT HAND: ICD-10-CM

## 2020-08-21 DIAGNOSIS — Z00.00 ROUTINE MEDICAL EXAM: Primary | ICD-10-CM

## 2020-08-21 DIAGNOSIS — J30.1 SEASONAL ALLERGIC RHINITIS DUE TO POLLEN: ICD-10-CM

## 2020-08-21 DIAGNOSIS — G89.29 CHRONIC LEFT SHOULDER PAIN: ICD-10-CM

## 2020-08-21 DIAGNOSIS — E78.2 MIXED HYPERLIPIDEMIA: ICD-10-CM

## 2020-08-21 PROBLEM — C61 PROSTATE CANCER (HCC): Status: ACTIVE | Noted: 2020-08-21

## 2020-08-21 PROCEDURE — 99396 PREV VISIT EST AGE 40-64: CPT | Performed by: INTERNAL MEDICINE

## 2020-08-21 NOTE — PROGRESS NOTES
Subjective:     Michelle Mcbride is a 62 y.o. male presenting for annual exam and complete physical.    Patient Active Problem List    Diagnosis Date Noted    Prostate cancer ([facility] 08/21/2020    Advance directive discussed with patient 05/26/2016    Mixed hyperlipidemia 01/25/2010    Allergic rhinitis due to pollen      Current Outpatient Medications   Medication Sig Dispense Refill    simvastatin (ZOCOR) 20 mg tablet TAKE 1 TABLET BY MOUTH AT NIGHTLY 90 Tab 3    cetirizine (ZYRTEC) 10 mg tablet Take  by mouth daily as needed.  ibuprofen (MOTRIN) 200 mg tablet Take  by mouth every six (6) hours as needed. Allergies   Allergen Reactions    Bactrim [Sulfamethoprim] Swelling     Past Medical History:   Diagnosis Date    Allergic rhinitis, cause unspecified     Hives     Mixed hyperlipidemia 1/25/2010    Prostate cancer ([facility] 8/21/2020     Past Surgical History:   Procedure Laterality Date    HX HEENT      HX VASECTOMY      HX WISDOM TEETH EXTRACTION       Family History   Problem Relation Age of Onset    Elevated Lipids Father     Cancer Paternal Grandmother         brain    Heart Disease Paternal Grandfather     Alcohol abuse Brother     Stroke Brother     Seizures Brother     Alcohol abuse Sister     Other Sister     Coronary Artery Disease Paternal Uncle     Coronary Artery Disease Paternal Uncle      Social History     Tobacco Use    Smoking status: Never Smoker    Smokeless tobacco: Never Used   Substance Use Topics    Alcohol use:  Yes     Alcohol/week: 3.0 standard drinks     Types: 3 Glasses of wine per week     Frequency: 2-3 times a week     Drinks per session: 1 or 2     Binge frequency: Never        Lab Results   Component Value Date/Time    WBC 6.6 02/07/2019 11:36 AM    HGB 15.1 02/07/2019 11:36 AM    HCT 45.1 02/07/2019 11:36 AM    PLATELET 311 51/73/9669 11:36 AM    MCV 92 02/07/2019 11:36 AM     Lab Results   Component Value Date/Time    Cholesterol, total 172 02/07/2019 11:36 AM    HDL Cholesterol 64 02/07/2019 11:36 AM    LDL, calculated 99 02/07/2019 11:36 AM    LDL-C, External 98 08/25/2018    Triglyceride 45 02/07/2019 11:36 AM    CHOL/HDL Ratio 3.0 01/25/2010 09:54 AM     Lab Results   Component Value Date/Time    ALT (SGPT) 21 02/07/2019 11:36 AM    Alk. phosphatase 38 (L) 02/07/2019 11:36 AM    Bilirubin, total 1.7 (H) 02/07/2019 11:36 AM    Albumin 4.7 02/07/2019 11:36 AM    Protein, total 7.0 02/07/2019 11:36 AM    PLATELET 723 41/03/4688 11:36 AM     Lab Results   Component Value Date/Time    GFR est non-AA 84 02/07/2019 11:36 AM    GFR est AA 98 02/07/2019 11:36 AM    Creatinine 1.00 02/07/2019 11:36 AM    BUN 17 02/07/2019 11:36 AM    Sodium 140 02/07/2019 11:36 AM    Potassium 4.9 02/07/2019 11:36 AM    Chloride 100 02/07/2019 11:36 AM    CO2 21 02/07/2019 11:36 AM     Lab Results   Component Value Date/Time    Prostate Specific Ag 4.2 (H) 02/07/2019 11:36 AM    Prostate Specific Ag 3.6 02/01/2018 03:43 PM    Prostate Specific Ag 3.3 05/26/2016 09:26 AM    Prostate Specific Ag 1.2 01/25/2010 09:54 AM    Prostate Specific Ag 0.9 01/23/2009 10:47 AM     Lab Results   Component Value Date/Time    TSH 1.070 02/07/2019 11:36 AM    TSH 0.967 05/26/2016 09:26 AM      Lab Results   Component Value Date/Time    Glucose 87 02/07/2019 11:36 AM         Review of Systems  A comprehensive review of systems was negative except for: Has not been exercising as much. His weight is up about 10 pounds. He continues to feel well. He does have a strong family history of heart disease. He wondered if he should get a calcium score testing done. He is having no cardiovascular symptoms. He does have some left shoulder decreased range of motion and pain. He had previously started physical therapy but had to stop due to the pandemic. He is also developed worsening contractures in his left fourth and fifth fingers.     Objective:     Visit Vitals  /78   Pulse (!) 54   Temp 97.8 °F (36.6 °C) (Temporal)   Resp 12   Ht 5' 10\" (1.778 m)   Wt 188 lb (85.3 kg)   SpO2 96%   BMI 26.98 kg/m²     Physical exam:   General appearance - alert, well appearing, and in no distress  Ears - bilateral TM's and external ear canals normal  Nose - normal and patent, no erythema, discharge or polyps  Mouth - mucous membranes moist, pharynx normal without lesions  Neck - supple, no significant adenopathy  Lymphatics - no palpable lymphadenopathy, no hepatosplenomegaly  Chest - clear to auscultation, no wheezes, rales or rhonchi, symmetric air entry  Heart - normal rate and regular rhythm  Abdomen - soft, nontender, nondistended, no masses or organomegaly  Back exam - full range of motion, no tenderness, palpable spasm or pain on motion  Neurological - alert, oriented, normal speech, no focal findings or movement disorder noted  Musculoskeletal - abnormal exam of left thick contractures of the left fourth and fifth fingers with some flexion of his fifth finger. Some anterior joint line tenderness in the left shoulder with decreased range of motion present. Extremities - peripheral pulses normal, no pedal edema, no clubbing or cyanosis     Assessment/Plan:     Diagnoses and all orders for this visit:    1. Routine medical exam  -     METABOLIC PANEL, COMPREHENSIVE  -     LIPID PANEL  -     CBC WITH AUTOMATED DIFF  -     TSH RFX ON ABNORMAL TO FREE T4    2. Mixed hyperlipidemia-LDL goal of 100. Check labs to be sure that is met. Tolerating statins well. He will consider a calcium score and I explained to him it might encourage us to get his LDL lower. 3. Seasonal allergic rhinitis due to pollen    4. Screen for colon cancer    5. Chronic left shoulder pain-refer to physical therapy and possible orthopedics for an injection if persist.  -     REFERRAL TO PHYSICAL THERAPY    6.  Dupuytren's contracture of left hand-refer to hand surgery for consideration of surgery.  -     REFERRAL TO ORTHOPEDICS Follow-up and Dispositions    · Return in about 1 year (around 8/21/2021). Advised him to call back or return to office if symptoms worsen/change/persist.  Discussed expected course/resolution/complications of diagnosis in detail with patient. Medication risks/benefits/costs/interactions/alternatives discussed with patient. He was given an after visit summary which includes diagnoses, current medications, & vitals. He expressed understanding with the diagnosis and plan.

## 2020-08-22 LAB
ALBUMIN SERPL-MCNC: 4.9 G/DL (ref 3.8–4.9)
ALBUMIN/GLOB SERPL: 2.7 {RATIO} (ref 1.2–2.2)
ALP SERPL-CCNC: 37 IU/L (ref 39–117)
ALT SERPL-CCNC: 14 IU/L (ref 0–44)
AST SERPL-CCNC: 15 IU/L (ref 0–40)
BASOPHILS # BLD AUTO: 0.1 X10E3/UL (ref 0–0.2)
BASOPHILS NFR BLD AUTO: 1 %
BILIRUB SERPL-MCNC: 2.5 MG/DL (ref 0–1.2)
BUN SERPL-MCNC: 18 MG/DL (ref 6–24)
BUN/CREAT SERPL: 17 (ref 9–20)
CALCIUM SERPL-MCNC: 9.3 MG/DL (ref 8.7–10.2)
CHLORIDE SERPL-SCNC: 101 MMOL/L (ref 96–106)
CHOLEST SERPL-MCNC: 178 MG/DL (ref 100–199)
CO2 SERPL-SCNC: 23 MMOL/L (ref 20–29)
CREAT SERPL-MCNC: 1.07 MG/DL (ref 0.76–1.27)
EOSINOPHIL # BLD AUTO: 0.1 X10E3/UL (ref 0–0.4)
EOSINOPHIL NFR BLD AUTO: 1 %
ERYTHROCYTE [DISTWIDTH] IN BLOOD BY AUTOMATED COUNT: 12 % (ref 11.6–15.4)
GLOBULIN SER CALC-MCNC: 1.8 G/DL (ref 1.5–4.5)
GLUCOSE SERPL-MCNC: 66 MG/DL (ref 65–99)
HCT VFR BLD AUTO: 47.7 % (ref 37.5–51)
HDLC SERPL-MCNC: 61 MG/DL
HGB BLD-MCNC: 16.5 G/DL (ref 13–17.7)
IMM GRANULOCYTES # BLD AUTO: 0 X10E3/UL (ref 0–0.1)
IMM GRANULOCYTES NFR BLD AUTO: 0 %
LDLC SERPL CALC-MCNC: 106 MG/DL (ref 0–99)
LYMPHOCYTES # BLD AUTO: 2.1 X10E3/UL (ref 0.7–3.1)
LYMPHOCYTES NFR BLD AUTO: 31 %
MCH RBC QN AUTO: 31.2 PG (ref 26.6–33)
MCHC RBC AUTO-ENTMCNC: 34.6 G/DL (ref 31.5–35.7)
MCV RBC AUTO: 90 FL (ref 79–97)
MONOCYTES # BLD AUTO: 0.7 X10E3/UL (ref 0.1–0.9)
MONOCYTES NFR BLD AUTO: 10 %
NEUTROPHILS # BLD AUTO: 3.9 X10E3/UL (ref 1.4–7)
NEUTROPHILS NFR BLD AUTO: 57 %
PLATELET # BLD AUTO: 231 X10E3/UL (ref 150–450)
POTASSIUM SERPL-SCNC: 4.4 MMOL/L (ref 3.5–5.2)
PROT SERPL-MCNC: 6.7 G/DL (ref 6–8.5)
RBC # BLD AUTO: 5.29 X10E6/UL (ref 4.14–5.8)
SODIUM SERPL-SCNC: 140 MMOL/L (ref 134–144)
TRIGL SERPL-MCNC: 55 MG/DL (ref 0–149)
TSH SERPL DL<=0.005 MIU/L-ACNC: 1.35 UIU/ML (ref 0.45–4.5)
VLDLC SERPL CALC-MCNC: 11 MG/DL (ref 5–40)
WBC # BLD AUTO: 6.8 X10E3/UL (ref 3.4–10.8)

## 2020-08-24 DIAGNOSIS — E78.2 MIXED HYPERLIPIDEMIA: Primary | ICD-10-CM

## 2020-08-24 RX ORDER — SIMVASTATIN 40 MG/1
40 TABLET, FILM COATED ORAL
Qty: 90 TAB | Refills: 2 | Status: SHIPPED | OUTPATIENT
Start: 2020-08-24 | End: 2021-05-26

## 2020-08-26 ENCOUNTER — HOSPITAL ENCOUNTER (OUTPATIENT)
Dept: CT IMAGING | Age: 55
Discharge: HOME | End: 2020-08-26
Attending: INTERNAL MEDICINE
Payer: SELF-PAY

## 2020-08-26 DIAGNOSIS — E78.2 MIXED HYPERLIPIDEMIA: ICD-10-CM

## 2020-08-26 PROCEDURE — 75571 CT HRT W/O DYE W/CA TEST: CPT

## 2020-10-06 ENCOUNTER — TELEPHONE (OUTPATIENT)
Dept: CARDIOLOGY CLINIC | Age: 55
End: 2020-10-06

## 2020-10-06 ENCOUNTER — OFFICE VISIT (OUTPATIENT)
Dept: CARDIOLOGY CLINIC | Age: 55
End: 2020-10-06
Payer: COMMERCIAL

## 2020-10-06 VITALS
OXYGEN SATURATION: 98 % | DIASTOLIC BLOOD PRESSURE: 70 MMHG | HEART RATE: 49 BPM | WEIGHT: 187.6 LBS | BODY MASS INDEX: 26.86 KG/M2 | RESPIRATION RATE: 15 BRPM | HEIGHT: 70 IN | SYSTOLIC BLOOD PRESSURE: 120 MMHG

## 2020-10-06 DIAGNOSIS — I25.10 CORONARY ARTERY DISEASE DUE TO LIPID RICH PLAQUE: Primary | ICD-10-CM

## 2020-10-06 DIAGNOSIS — R06.89 DYSPNEA AND RESPIRATORY ABNORMALITY: ICD-10-CM

## 2020-10-06 DIAGNOSIS — I25.10 CORONARY ARTERY DISEASE DUE TO LIPID RICH PLAQUE: ICD-10-CM

## 2020-10-06 DIAGNOSIS — R53.83 FATIGUE, UNSPECIFIED TYPE: ICD-10-CM

## 2020-10-06 DIAGNOSIS — Z01.818 PRE-OP TESTING: ICD-10-CM

## 2020-10-06 DIAGNOSIS — C61 PROSTATE CANCER (HCC): ICD-10-CM

## 2020-10-06 DIAGNOSIS — I25.10 CORONARY ARTERY DISEASE INVOLVING NATIVE CORONARY ARTERY OF NATIVE HEART WITHOUT ANGINA PECTORIS: ICD-10-CM

## 2020-10-06 DIAGNOSIS — E55.9 VITAMIN D DEFICIENCY: ICD-10-CM

## 2020-10-06 DIAGNOSIS — R00.1 BRADYCARDIA: ICD-10-CM

## 2020-10-06 DIAGNOSIS — R06.00 DYSPNEA AND RESPIRATORY ABNORMALITY: ICD-10-CM

## 2020-10-06 DIAGNOSIS — I25.83 CORONARY ARTERY DISEASE DUE TO LIPID RICH PLAQUE: ICD-10-CM

## 2020-10-06 DIAGNOSIS — E78.2 MIXED HYPERLIPIDEMIA: ICD-10-CM

## 2020-10-06 DIAGNOSIS — I25.83 CORONARY ARTERY DISEASE DUE TO LIPID RICH PLAQUE: Primary | ICD-10-CM

## 2020-10-06 PROCEDURE — 99244 OFF/OP CNSLTJ NEW/EST MOD 40: CPT | Performed by: INTERNAL MEDICINE

## 2020-10-06 PROCEDURE — 93000 ELECTROCARDIOGRAM COMPLETE: CPT | Performed by: INTERNAL MEDICINE

## 2020-10-06 RX ORDER — ASPIRIN 81 MG/1
TABLET ORAL DAILY
COMMUNITY

## 2020-10-06 NOTE — PATIENT INSTRUCTIONS
Please have your fasting cholesterol rechecked in 3 months - your LDL goal is <70    Please follow a Mediterranean diet and exercise for 45 minutes/day 5 days/week  Please eat 5-7 servings of fruits and vegetables daily with some healthy fats  Please cut your cheese intake in half     We will contact you by phone or through [phone] about your stress test results

## 2020-10-06 NOTE — PROGRESS NOTES
Reason for consult: CAD  Requesting physician: Dr. Adán Coffey    HPI: Nisha Fernandez, a 62y.o. year-old who presents for evaluation of CAD. He had a coronary calcium scan in 8/20 which showed CAD, kgjuh=175, mostly in his LM and LAD  He denies any chest pain or palpitations  No dyspnea with exertion  No PND or orthopnea   Feels tired after working in the yard    No dizziness or syncope  No LE edema   Following a low carb diet  Had been doing hot yoga 3-4 days/week before the pandemic   Up until 3.5 years ago he ran regularly, stopped due to knee pain, used to run 3-6 miles 4 days/week  Now he is walking 30-40 minutes 3 days/week   Has some calf    Today we spent some time reviewing obstructive versus nonobstructive plaque and his personal risk for future MI and ischemic events based on the amount of plaque at this young age as well as its concentration in the LAD. I would classify this is a high risk pattern he needs to have his LDL optimized to less than 70. His simvastatin was just increased and he will have lab work again in 3 months. He will I recommend he take a baby aspirin given he is between the ages of 48 and 77 with a high risk pattern on his calcium score. We reviewed the bleeding risks associated with aspirin. Given his relative decrease in exercise capacity although it does seem multifactorial in the setting of left main disease going to proceed with a stress echo for further investigation of his coronary artery disease and whether any of it has become obstructive. His bradycardia is probably related to his active lifestyle. He does have a suboptimal diet with high fat eating lots of cheese and we reviewed the need for lots of fruits and vegetables and following a Mediterranean diet today as well. He will proceed with stress echo and repeat cholesterol check in 3 months. He will follow-up with Dr. Adán Coffey regularly and with me as needed if his stress test looks okay.   I have recommended he consider stress testing every 3 to 5 years based on his symptoms and activity level and his high risk coronary disease profile by calcium scoring. Assessment/Plan:  1. CAD - ca score 146 but concerning distribution in the LM and LAD, with some fatigue with activity will proceed with stress echo to assess for ischemia  -continue ASA and statin, discussed LDL goal <70, advised him to follow Mediterranean diet and exercise regularly at least 5 days/week for 45 minutes/day   -discussed my recommendation that he have a stress test about every 3 years or earlier if symptoms develop  -if his stress test is ok he will follow up here again as needed, advised him to return in 3 years for a stress test  2. Dyslipidemia - , then had simvastatin dose increased to 40mg daily, will recheck lipids again in 3 months, LDL goal <70  3. Prostate cancer - no surgery yet, just under surveillance  4. Frozen shoulder  5. Vitamin D deficiency - will check Vitamin D level     Fam Hx: paternal GF had CABG in his 62s, paternal uncles with MIs in 46s and 76s, father with dyslipidemia, mother is ok     Soc Hx: no tobacco use, social etoh 3-4 drinks/week    He  has a past medical history of Allergic rhinitis, cause unspecified, Hives, Mixed hyperlipidemia (1/25/2010), and Prostate cancer ([facility] (8/21/2020). Cardiovascular ROS: no chest pain or dyspnea on exertion  Respiratory ROS: no cough, shortness of breath, or wheezing  Neurological ROS: no TIA or stroke symptoms  All other systems negative except as above. PE  Vitals:    10/06/20 0750   BP: 120/70   Pulse: (!) 49   Resp: 15   SpO2: 98%   Weight: 187 lb 9.6 oz (85.1 kg)   Height: 5' 10\" (1.778 m)    Body mass index is 26.92 kg/m².    General appearance - alert, well appearing, and in no distress  Mental status - affect appropriate to mood  Eyes - sclera anicteric, moist mucous membranes  Neck - supple  Lymphatics - not assessed  Chest - clear to auscultation, no wheezes, rales or rhonchi  Heart - bradycardic, regular rhythm, normal S1, S2, no murmurs, rubs, clicks or gallops  Abdomen - soft, nontender, nondistended  Back exam - full range of motion, no tenderness  Neurological - cranial nerves II through XII grossly intact, no focal deficit  Musculoskeletal - no muscular tenderness noted, normal strength  Extremities - peripheral pulses normal, no pedal edema  Skin - normal coloration  no rashes    12 lead ECG: sinus bradycardia, VR 49 bpm     Recent Labs:  Lab Results   Component Value Date/Time    Cholesterol, total 178 08/21/2020 02:26 PM    HDL Cholesterol 61 08/21/2020 02:26 PM    LDL, calculated 106 (H) 08/21/2020 02:26 PM    Triglyceride 55 08/21/2020 02:26 PM    CHOL/HDL Ratio 3.0 01/25/2010 09:54 AM     Lab Results   Component Value Date/Time    Creatinine 1.07 08/21/2020 02:26 PM     Lab Results   Component Value Date/Time    BUN 18 08/21/2020 02:26 PM     Lab Results   Component Value Date/Time    Potassium 4.4 08/21/2020 02:26 PM     Lab Results   Component Value Date/Time    Hemoglobin A1c, External 5.3 08/25/2018     Lab Results   Component Value Date/Time    HGB 16.5 08/21/2020 02:26 PM     Lab Results   Component Value Date/Time    PLATELET 103 02/88/7543 02:26 PM       Reviewed:  Past Medical History:   Diagnosis Date    Allergic rhinitis, cause unspecified     Hives     Mixed hyperlipidemia 1/25/2010    Prostate cancer ([facility] 8/21/2020     Social History     Tobacco Use   Smoking Status Never Smoker   Smokeless Tobacco Never Used     Social History     Substance and Sexual Activity   Alcohol Use Yes    Alcohol/week: 3.0 standard drinks    Types: 3 Glasses of wine per week    Frequency: 2-3 times a week    Drinks per session: 1 or 2    Binge frequency: Never     Allergies   Allergen Reactions    Bactrim [Sulfamethoprim] Swelling       Current Outpatient Medications   Medication Sig    aspirin delayed-release 81 mg tablet Take  by mouth daily.     simvastatin (ZOCOR) 40 mg tablet Take 1 Tab by mouth nightly.  cetirizine (ZYRTEC) 10 mg tablet Take  by mouth daily as needed.  ibuprofen (MOTRIN) 200 mg tablet Take  by mouth every six (6) hours as needed. No current facility-administered medications for this visit.         Abigail De Jesus MD

## 2020-10-19 ENCOUNTER — TRANSCRIBE ORDER (OUTPATIENT)
Dept: REGISTRATION | Age: 55
End: 2020-10-19

## 2020-10-19 ENCOUNTER — HOSPITAL ENCOUNTER (OUTPATIENT)
Dept: PREADMISSION TESTING | Age: 55
Discharge: HOME | End: 2020-10-19
Payer: COMMERCIAL

## 2020-10-19 DIAGNOSIS — Z01.812 PRE-PROCEDURE LAB EXAM: ICD-10-CM

## 2020-10-19 DIAGNOSIS — Z01.812 PRE-PROCEDURE LAB EXAM: Primary | ICD-10-CM

## 2020-10-19 PROCEDURE — 87635 SARS-COV-2 COVID-19 AMP PRB: CPT

## 2020-10-20 DIAGNOSIS — Z01.818 PRE-OP TESTING: ICD-10-CM

## 2020-10-20 LAB — SARS-COV-2, COV2NT: NOT DETECTED

## 2020-10-23 ENCOUNTER — ANCILLARY PROCEDURE (OUTPATIENT)
Dept: CARDIOLOGY CLINIC | Age: 55
End: 2020-10-23
Payer: COMMERCIAL

## 2020-10-23 ENCOUNTER — APPOINTMENT (OUTPATIENT)
Dept: CARDIOLOGY CLINIC | Age: 55
End: 2020-10-23

## 2020-10-23 ENCOUNTER — TELEPHONE (OUTPATIENT)
Dept: CARDIOLOGY CLINIC | Age: 55
End: 2020-10-23

## 2020-10-23 VITALS — WEIGHT: 187 LBS | HEIGHT: 70 IN | BODY MASS INDEX: 26.77 KG/M2

## 2020-10-23 DIAGNOSIS — E78.2 MIXED HYPERLIPIDEMIA: ICD-10-CM

## 2020-10-23 DIAGNOSIS — I25.83 CORONARY ARTERY DISEASE DUE TO LIPID RICH PLAQUE: ICD-10-CM

## 2020-10-23 DIAGNOSIS — I25.10 CORONARY ARTERY DISEASE DUE TO LIPID RICH PLAQUE: ICD-10-CM

## 2020-10-23 DIAGNOSIS — R53.83 FATIGUE, UNSPECIFIED TYPE: ICD-10-CM

## 2020-10-23 LAB
STRESS ANGINA INDEX: 0
STRESS BASELINE DIAS BP: 80 MMHG
STRESS BASELINE HR: 57 BPM
STRESS BASELINE SYS BP: 112 MMHG
STRESS ESTIMATED WORKLOAD: 13.4 METS
STRESS EXERCISE DUR MIN: NORMAL MIN:SEC
STRESS O2 SAT PEAK: 93 %
STRESS O2 SAT REST: 93 %
STRESS PEAK DIAS BP: 90 MMHG
STRESS PEAK SYS BP: 240 MMHG
STRESS PERCENT HR ACHIEVED: 104 %
STRESS POST PEAK HR: 171 BPM
STRESS RATE PRESSURE PRODUCT: NORMAL BPM*MMHG
STRESS SR DUKE TREADMILL SCORE: -9
STRESS ST DEPRESSION: 4 MM
STRESS TARGET HR: 164 BPM

## 2020-10-23 PROCEDURE — 93351 STRESS TTE COMPLETE: CPT | Performed by: INTERNAL MEDICINE

## 2020-10-23 NOTE — TELEPHONE ENCOUNTER
Verified patient with two types of identifiers. Notified patient have not received official results; however, interpretation summary indication low risk study. Notified patient will message with results once finalized. Patient verbalized understanding and will call with any other questions.

## 2020-10-23 NOTE — TELEPHONE ENCOUNTER
Patient is calling as he received his stress test results on [facility] and is \"concerned\" about them. Patient states that he is currently scheduled to have a procedure on his prostate on 11/9 and would like to ensure that these results will not prohibit him from proceeding. Please advise.     Phone: [phone]

## 2020-10-30 ENCOUNTER — HOSPITAL ENCOUNTER (OUTPATIENT)
Dept: GENERAL RADIOLOGY | Age: 55
Discharge: HOME | End: 2020-10-30
Attending: UROLOGY
Payer: COMMERCIAL

## 2020-10-30 ENCOUNTER — HOSPITAL ENCOUNTER (OUTPATIENT)
Dept: PREADMISSION TESTING | Age: 55
Discharge: HOME | End: 2020-10-30
Payer: COMMERCIAL

## 2020-10-30 VITALS
HEIGHT: 70 IN | DIASTOLIC BLOOD PRESSURE: 77 MMHG | TEMPERATURE: 98.3 F | BODY MASS INDEX: 26.51 KG/M2 | WEIGHT: 185.19 LBS | HEART RATE: 64 BPM | SYSTOLIC BLOOD PRESSURE: 137 MMHG

## 2020-10-30 LAB
ALBUMIN SERPL-MCNC: 3.9 G/DL (ref 3.5–5)
ALBUMIN/GLOB SERPL: 1.4 {RATIO} (ref 1.1–2.2)
ALP SERPL-CCNC: 39 U/L (ref 45–117)
ALT SERPL-CCNC: 21 U/L (ref 12–78)
ANION GAP SERPL CALC-SCNC: 7 MMOL/L (ref 5–15)
APPEARANCE UR: CLEAR
APTT PPP: 28.5 SEC (ref 22.1–32)
AST SERPL-CCNC: 16 U/L (ref 15–37)
BACTERIA URNS QL MICRO: NEGATIVE /HPF
BASOPHILS # BLD: 0.1 K/UL (ref 0–0.1)
BASOPHILS NFR BLD: 1 % (ref 0–1)
BILIRUB SERPL-MCNC: 1.1 MG/DL (ref 0.2–1)
BILIRUB UR QL: NEGATIVE
BUN SERPL-MCNC: 23 MG/DL (ref 6–20)
BUN/CREAT SERPL: 26 (ref 12–20)
CALCIUM SERPL-MCNC: 9 MG/DL (ref 8.5–10.1)
CHLORIDE SERPL-SCNC: 106 MMOL/L (ref 97–108)
CO2 SERPL-SCNC: 27 MMOL/L (ref 21–32)
COLOR UR: NORMAL
CREAT SERPL-MCNC: 0.89 MG/DL (ref 0.7–1.3)
DIFFERENTIAL METHOD BLD: NORMAL
EOSINOPHIL # BLD: 0 K/UL (ref 0–0.4)
EOSINOPHIL NFR BLD: 0 % (ref 0–7)
EPITH CASTS URNS QL MICRO: NORMAL /LPF
ERYTHROCYTE [DISTWIDTH] IN BLOOD BY AUTOMATED COUNT: 12.7 % (ref 11.5–14.5)
GLOBULIN SER CALC-MCNC: 2.7 G/DL (ref 2–4)
GLUCOSE SERPL-MCNC: 79 MG/DL (ref 65–100)
GLUCOSE UR STRIP.AUTO-MCNC: NEGATIVE MG/DL
HCT VFR BLD AUTO: 46.4 % (ref 36.6–50.3)
HGB BLD-MCNC: 15.6 G/DL (ref 12.1–17)
HGB UR QL STRIP: NEGATIVE
HISTORY CHECKED?,CKHIST: NORMAL
HYALINE CASTS URNS QL MICRO: NORMAL /LPF (ref 0–5)
IMM GRANULOCYTES # BLD AUTO: 0 K/UL
IMM GRANULOCYTES NFR BLD AUTO: 0 %
INR PPP: 1 (ref 0.9–1.1)
KETONES UR QL STRIP.AUTO: NEGATIVE MG/DL
LEUKOCYTE ESTERASE UR QL STRIP.AUTO: NEGATIVE
LYMPHOCYTES # BLD: 1.9 K/UL (ref 0.8–3.5)
LYMPHOCYTES NFR BLD: 33 % (ref 12–49)
MCH RBC QN AUTO: 31.3 PG (ref 26–34)
MCHC RBC AUTO-ENTMCNC: 33.6 G/DL (ref 30–36.5)
MCV RBC AUTO: 93 FL (ref 80–99)
MONOCYTES # BLD: 0.4 K/UL (ref 0–1)
MONOCYTES NFR BLD: 7 % (ref 5–13)
NEUTS SEG # BLD: 3.3 K/UL (ref 1.8–8)
NEUTS SEG NFR BLD: 59 % (ref 32–75)
NITRITE UR QL STRIP.AUTO: NEGATIVE
NRBC # BLD: 0 K/UL (ref 0–0.01)
NRBC BLD-RTO: 0 PER 100 WBC
PH UR STRIP: 5 [PH] (ref 5–8)
PLATELET # BLD AUTO: 236 K/UL (ref 150–400)
PMV BLD AUTO: 10.6 FL (ref 8.9–12.9)
POTASSIUM SERPL-SCNC: 4 MMOL/L (ref 3.5–5.1)
PROT SERPL-MCNC: 6.6 G/DL (ref 6.4–8.2)
PROT UR STRIP-MCNC: NEGATIVE MG/DL
PROTHROMBIN TIME: 10.9 SEC (ref 9–11.1)
RBC # BLD AUTO: 4.99 M/UL (ref 4.1–5.7)
RBC #/AREA URNS HPF: NORMAL /HPF (ref 0–5)
RBC MORPH BLD: NORMAL
SODIUM SERPL-SCNC: 140 MMOL/L (ref 136–145)
SP GR UR REFRACTOMETRY: 1.02 (ref 1–1.03)
THERAPEUTIC RANGE,PTTT: NORMAL SECS (ref 58–77)
UROBILINOGEN UR QL STRIP.AUTO: 0.2 EU/DL (ref 0.2–1)
WBC # BLD AUTO: 5.7 K/UL (ref 4.1–11.1)
WBC URNS QL MICRO: NORMAL /HPF (ref 0–4)

## 2020-10-30 PROCEDURE — 71046 X-RAY EXAM CHEST 2 VIEWS: CPT

## 2020-10-30 PROCEDURE — 80053 COMPREHEN METABOLIC PANEL: CPT

## 2020-10-30 PROCEDURE — 87086 URINE CULTURE/COLONY COUNT: CPT

## 2020-10-30 PROCEDURE — 81001 URINALYSIS AUTO W/SCOPE: CPT

## 2020-10-30 PROCEDURE — 85730 THROMBOPLASTIN TIME PARTIAL: CPT

## 2020-10-30 PROCEDURE — 85610 PROTHROMBIN TIME: CPT

## 2020-10-30 PROCEDURE — 85025 COMPLETE CBC W/AUTO DIFF WBC: CPT

## 2020-10-30 PROCEDURE — 86900 BLOOD TYPING SEROLOGIC ABO: CPT

## 2020-10-30 PROCEDURE — 36415 COLL VENOUS BLD VENIPUNCTURE: CPT

## 2020-10-30 RX ORDER — SODIUM CHLORIDE 9 MG/ML
250 INJECTION, SOLUTION INTRAVENOUS AS NEEDED
Status: DISCONTINUED | OUTPATIENT
Start: 2020-10-30 | End: 2020-11-03 | Stop reason: HOSPADM

## 2020-10-30 NOTE — PERIOP NOTES
PREOPERATIVE INSTRUCTIONS REVIEWED WITH PATIENT. PATIENT GIVEN TWO-- BOTTLES OF CHG sponges  INSTRUCTIONS REVIEWED ON USE OF CHG sponges  PATIENT GIVEN SSI INFECTIONS SHEET. PATIENT WAS GIVEN THE OPPORTUNITY TO ASK QUESTIONS ON THE INFORMATION PROVIDED. PT  MADE AWARE OF COVID 19 TESTING NEEDED TO BE DONE WITHIN 96 HOURS OF SURGERY. PT INSTRUCTED ON SELF QUARANTINE  BETWEEN TESTING AND ARRIVAL TIME  ON DAY OF SURGERY. INSTRUCTION PROVIDED FOR CELL PHONE WAITING AREA, PT INFORMATION AND INSTRUCTIONS FOR APPOINTMENTS AT [facility] ON DAY OF SURGERY.

## 2020-10-31 LAB
ABO + RH BLD: NORMAL
BACTERIA SPEC CULT: NORMAL
BLOOD GROUP ANTIBODIES SERPL: NORMAL
SERVICE CMNT-IMP: NORMAL
SPECIMEN EXP DATE BLD: NORMAL

## 2020-11-05 ENCOUNTER — HOSPITAL ENCOUNTER (OUTPATIENT)
Dept: PREADMISSION TESTING | Age: 55
Discharge: HOME | End: 2020-11-05
Payer: COMMERCIAL

## 2020-11-05 ENCOUNTER — TRANSCRIBE ORDER (OUTPATIENT)
Dept: REGISTRATION | Age: 55
End: 2020-11-05

## 2020-11-05 DIAGNOSIS — Z01.812 PRE-PROCEDURE LAB EXAM: Primary | ICD-10-CM

## 2020-11-05 DIAGNOSIS — Z01.812 PRE-PROCEDURE LAB EXAM: ICD-10-CM

## 2020-11-05 PROCEDURE — 87635 SARS-COV-2 COVID-19 AMP PRB: CPT

## 2020-11-06 LAB — SARS-COV-2, COV2NT: NOT DETECTED

## 2020-11-08 ENCOUNTER — ANESTHESIA EVENT (OUTPATIENT)
Dept: SURGERY | Age: 55
DRG: 708 | End: 2020-11-08
Payer: COMMERCIAL

## 2020-11-09 ENCOUNTER — HOSPITAL ENCOUNTER (INPATIENT)
Age: 55
LOS: 2 days | Discharge: HOME | DRG: 708 | End: 2020-11-11
Attending: UROLOGY | Admitting: UROLOGY
Payer: COMMERCIAL

## 2020-11-09 ENCOUNTER — ANESTHESIA (OUTPATIENT)
Dept: SURGERY | Age: 55
DRG: 708 | End: 2020-11-09
Payer: COMMERCIAL

## 2020-11-09 LAB
HCT VFR BLD AUTO: 40.7 % (ref 36.6–50.3)
HGB BLD-MCNC: 13.5 G/DL (ref 12.1–17)

## 2020-11-09 PROCEDURE — 77030008684 HC TU ET CUF COVD -B: Performed by: NURSE ANESTHETIST, CERTIFIED REGISTERED

## 2020-11-09 PROCEDURE — 74011000250 HC RX REV CODE- 250: Performed by: UROLOGY

## 2020-11-09 PROCEDURE — 74011250637 HC RX REV CODE- 250/637: Performed by: UROLOGY

## 2020-11-09 PROCEDURE — 85018 HEMOGLOBIN: CPT

## 2020-11-09 PROCEDURE — 77030040504 HC DRN WND MDII -B: Performed by: UROLOGY

## 2020-11-09 PROCEDURE — 65270000032 HC RM SEMIPRIVATE

## 2020-11-09 PROCEDURE — 77030002996 HC SUT SLK J&J -A: Performed by: UROLOGY

## 2020-11-09 PROCEDURE — 88309 TISSUE EXAM BY PATHOLOGIST: CPT

## 2020-11-09 PROCEDURE — 76010000172 HC OR TIME 2.5 TO 3 HR INTENSV-TIER 1: Performed by: UROLOGY

## 2020-11-09 PROCEDURE — 77030040361 HC SLV COMPR DVT MDII -B: Performed by: UROLOGY

## 2020-11-09 PROCEDURE — 77030002888 HC SUT CHRMC J&J -A: Performed by: UROLOGY

## 2020-11-09 PROCEDURE — 74011250636 HC RX REV CODE- 250/636: Performed by: NURSE ANESTHETIST, CERTIFIED REGISTERED

## 2020-11-09 PROCEDURE — 74011250636 HC RX REV CODE- 250/636: Performed by: ANESTHESIOLOGY

## 2020-11-09 PROCEDURE — 36415 COLL VENOUS BLD VENIPUNCTURE: CPT

## 2020-11-09 PROCEDURE — 74011250636 HC RX REV CODE- 250/636: Performed by: UROLOGY

## 2020-11-09 PROCEDURE — P9045 ALBUMIN (HUMAN), 5%, 250 ML: HCPCS | Performed by: NURSE ANESTHETIST, CERTIFIED REGISTERED

## 2020-11-09 PROCEDURE — 07BC0ZX EXCISION OF PELVIS LYMPHATIC, OPEN APPROACH, DIAGNOSTIC: ICD-10-PCS | Performed by: UROLOGY

## 2020-11-09 PROCEDURE — 0VT00ZZ RESECTION OF PROSTATE, OPEN APPROACH: ICD-10-PCS | Performed by: UROLOGY

## 2020-11-09 PROCEDURE — 74011000636 HC RX REV CODE- 636: Performed by: NURSE ANESTHETIST, CERTIFIED REGISTERED

## 2020-11-09 PROCEDURE — 76060000036 HC ANESTHESIA 2.5 TO 3 HR: Performed by: UROLOGY

## 2020-11-09 PROCEDURE — 77030010516 HC APPL HEMA CLP TELE -B: Performed by: UROLOGY

## 2020-11-09 PROCEDURE — 77030040831 HC BAG URINE DRNG MDII -A: Performed by: UROLOGY

## 2020-11-09 PROCEDURE — 76210000016 HC OR PH I REC 1 TO 1.5 HR: Performed by: UROLOGY

## 2020-11-09 PROCEDURE — 74011000258 HC RX REV CODE- 258: Performed by: UROLOGY

## 2020-11-09 PROCEDURE — 77030013079 HC BLNKT BAIR HGGR 3M -A: Performed by: NURSE ANESTHETIST, CERTIFIED REGISTERED

## 2020-11-09 PROCEDURE — 74011250637 HC RX REV CODE- 250/637: Performed by: ANESTHESIOLOGY

## 2020-11-09 PROCEDURE — 77030040356 HC CORD BPLR FRCP COVD -A: Performed by: UROLOGY

## 2020-11-09 PROCEDURE — 77030040506 HC DRN WND MDII -A: Performed by: UROLOGY

## 2020-11-09 PROCEDURE — 77030026438 HC STYL ET INTUB CARD -A: Performed by: NURSE ANESTHETIST, CERTIFIED REGISTERED

## 2020-11-09 PROCEDURE — 88307 TISSUE EXAM BY PATHOLOGIST: CPT

## 2020-11-09 PROCEDURE — 77030040830 HC CATH URETH FOL MDII -A: Performed by: UROLOGY

## 2020-11-09 PROCEDURE — 74011000250 HC RX REV CODE- 250: Performed by: NURSE ANESTHETIST, CERTIFIED REGISTERED

## 2020-11-09 PROCEDURE — 77030031139 HC SUT VCRL2 J&J -A: Performed by: UROLOGY

## 2020-11-09 PROCEDURE — 77030008462 HC STPLR SKN PROX J&J -A: Performed by: UROLOGY

## 2020-11-09 PROCEDURE — 77030029099 HC BN WAX SSPC -A: Performed by: UROLOGY

## 2020-11-09 PROCEDURE — 2709999900 HC NON-CHARGEABLE SUPPLY: Performed by: UROLOGY

## 2020-11-09 RX ORDER — FENTANYL CITRATE 50 UG/ML
25 INJECTION, SOLUTION INTRAMUSCULAR; INTRAVENOUS
Status: COMPLETED | OUTPATIENT
Start: 2020-11-09 | End: 2020-11-09

## 2020-11-09 RX ORDER — SODIUM CHLORIDE 0.9 % (FLUSH) 0.9 %
5-40 SYRINGE (ML) INJECTION AS NEEDED
Status: DISCONTINUED | OUTPATIENT
Start: 2020-11-09 | End: 2020-11-09 | Stop reason: HOSPADM

## 2020-11-09 RX ORDER — SODIUM CHLORIDE, SODIUM LACTATE, POTASSIUM CHLORIDE, CALCIUM CHLORIDE 600; 310; 30; 20 MG/100ML; MG/100ML; MG/100ML; MG/100ML
INJECTION, SOLUTION INTRAVENOUS
Status: DISCONTINUED | OUTPATIENT
Start: 2020-11-09 | End: 2020-11-09 | Stop reason: HOSPADM

## 2020-11-09 RX ORDER — CEFAZOLIN SODIUM/WATER 2 G/20 ML
2 SYRINGE (ML) INTRAVENOUS
Status: COMPLETED | OUTPATIENT
Start: 2020-11-09 | End: 2020-11-09

## 2020-11-09 RX ORDER — LORAZEPAM 2 MG/ML
1 INJECTION INTRAMUSCULAR
Status: DISCONTINUED | OUTPATIENT
Start: 2020-11-09 | End: 2020-11-11 | Stop reason: HOSPADM

## 2020-11-09 RX ORDER — SODIUM CHLORIDE 0.9 % (FLUSH) 0.9 %
5-40 SYRINGE (ML) INJECTION EVERY 8 HOURS
Status: DISCONTINUED | OUTPATIENT
Start: 2020-11-09 | End: 2020-11-09 | Stop reason: HOSPADM

## 2020-11-09 RX ORDER — MIDAZOLAM HYDROCHLORIDE 1 MG/ML
INJECTION, SOLUTION INTRAMUSCULAR; INTRAVENOUS AS NEEDED
Status: DISCONTINUED | OUTPATIENT
Start: 2020-11-09 | End: 2020-11-09 | Stop reason: HOSPADM

## 2020-11-09 RX ORDER — SODIUM CHLORIDE 0.9 % (FLUSH) 0.9 %
5-40 SYRINGE (ML) INJECTION AS NEEDED
Status: DISCONTINUED | OUTPATIENT
Start: 2020-11-09 | End: 2020-11-11 | Stop reason: HOSPADM

## 2020-11-09 RX ORDER — FENTANYL CITRATE 50 UG/ML
50 INJECTION, SOLUTION INTRAMUSCULAR; INTRAVENOUS AS NEEDED
Status: DISCONTINUED | OUTPATIENT
Start: 2020-11-09 | End: 2020-11-09 | Stop reason: HOSPADM

## 2020-11-09 RX ORDER — KETAMINE HYDROCHLORIDE 10 MG/ML
INJECTION, SOLUTION INTRAMUSCULAR; INTRAVENOUS AS NEEDED
Status: DISCONTINUED | OUTPATIENT
Start: 2020-11-09 | End: 2020-11-09 | Stop reason: HOSPADM

## 2020-11-09 RX ORDER — ATROPA BELLADONNA AND OPIUM 16.2; 3 MG/1; MG/1
1 SUPPOSITORY RECTAL ONCE
Status: DISCONTINUED | OUTPATIENT
Start: 2020-11-09 | End: 2020-11-09 | Stop reason: HOSPADM

## 2020-11-09 RX ORDER — DEXMEDETOMIDINE HYDROCHLORIDE 100 UG/ML
INJECTION, SOLUTION INTRAVENOUS AS NEEDED
Status: DISCONTINUED | OUTPATIENT
Start: 2020-11-09 | End: 2020-11-09 | Stop reason: HOSPADM

## 2020-11-09 RX ORDER — HYDROMORPHONE HYDROCHLORIDE 1 MG/ML
0.2 INJECTION, SOLUTION INTRAMUSCULAR; INTRAVENOUS; SUBCUTANEOUS
Status: DISCONTINUED | OUTPATIENT
Start: 2020-11-09 | End: 2020-11-09 | Stop reason: HOSPADM

## 2020-11-09 RX ORDER — DEXAMETHASONE SODIUM PHOSPHATE 4 MG/ML
INJECTION, SOLUTION INTRA-ARTICULAR; INTRALESIONAL; INTRAMUSCULAR; INTRAVENOUS; SOFT TISSUE AS NEEDED
Status: DISCONTINUED | OUTPATIENT
Start: 2020-11-09 | End: 2020-11-09 | Stop reason: HOSPADM

## 2020-11-09 RX ORDER — NEOSTIGMINE METHYLSULFATE 1 MG/ML
INJECTION, SOLUTION INTRAVENOUS AS NEEDED
Status: DISCONTINUED | OUTPATIENT
Start: 2020-11-09 | End: 2020-11-09 | Stop reason: HOSPADM

## 2020-11-09 RX ORDER — GLYCOPYRROLATE 0.2 MG/ML
INJECTION INTRAMUSCULAR; INTRAVENOUS AS NEEDED
Status: DISCONTINUED | OUTPATIENT
Start: 2020-11-09 | End: 2020-11-09 | Stop reason: HOSPADM

## 2020-11-09 RX ORDER — ONDANSETRON 2 MG/ML
INJECTION INTRAMUSCULAR; INTRAVENOUS AS NEEDED
Status: DISCONTINUED | OUTPATIENT
Start: 2020-11-09 | End: 2020-11-09 | Stop reason: HOSPADM

## 2020-11-09 RX ORDER — PROPOFOL 10 MG/ML
INJECTION, EMULSION INTRAVENOUS AS NEEDED
Status: DISCONTINUED | OUTPATIENT
Start: 2020-11-09 | End: 2020-11-09 | Stop reason: HOSPADM

## 2020-11-09 RX ORDER — DEXTROSE MONOHYDRATE AND SODIUM CHLORIDE 5; .45 G/100ML; G/100ML
100 INJECTION, SOLUTION INTRAVENOUS CONTINUOUS
Status: DISCONTINUED | OUTPATIENT
Start: 2020-11-09 | End: 2020-11-11 | Stop reason: HOSPADM

## 2020-11-09 RX ORDER — MIDAZOLAM HYDROCHLORIDE 1 MG/ML
1 INJECTION, SOLUTION INTRAMUSCULAR; INTRAVENOUS AS NEEDED
Status: DISCONTINUED | OUTPATIENT
Start: 2020-11-09 | End: 2020-11-09 | Stop reason: HOSPADM

## 2020-11-09 RX ORDER — KETOROLAC TROMETHAMINE 30 MG/ML
15 INJECTION, SOLUTION INTRAMUSCULAR; INTRAVENOUS
Status: DISCONTINUED | OUTPATIENT
Start: 2020-11-09 | End: 2020-11-11 | Stop reason: HOSPADM

## 2020-11-09 RX ORDER — SODIUM CHLORIDE 0.9 % (FLUSH) 0.9 %
5-40 SYRINGE (ML) INJECTION EVERY 8 HOURS
Status: DISCONTINUED | OUTPATIENT
Start: 2020-11-09 | End: 2020-11-11 | Stop reason: HOSPADM

## 2020-11-09 RX ORDER — ONDANSETRON 2 MG/ML
4 INJECTION INTRAMUSCULAR; INTRAVENOUS AS NEEDED
Status: DISCONTINUED | OUTPATIENT
Start: 2020-11-09 | End: 2020-11-09 | Stop reason: HOSPADM

## 2020-11-09 RX ORDER — FENTANYL CITRATE 50 UG/ML
INJECTION, SOLUTION INTRAMUSCULAR; INTRAVENOUS AS NEEDED
Status: DISCONTINUED | OUTPATIENT
Start: 2020-11-09 | End: 2020-11-09 | Stop reason: HOSPADM

## 2020-11-09 RX ORDER — LIDOCAINE HYDROCHLORIDE 10 MG/ML
0.1 INJECTION, SOLUTION EPIDURAL; INFILTRATION; INTRACAUDAL; PERINEURAL AS NEEDED
Status: DISCONTINUED | OUTPATIENT
Start: 2020-11-09 | End: 2020-11-09 | Stop reason: HOSPADM

## 2020-11-09 RX ORDER — LIDOCAINE HYDROCHLORIDE 20 MG/ML
INJECTION, SOLUTION EPIDURAL; INFILTRATION; INTRACAUDAL; PERINEURAL AS NEEDED
Status: DISCONTINUED | OUTPATIENT
Start: 2020-11-09 | End: 2020-11-09 | Stop reason: HOSPADM

## 2020-11-09 RX ORDER — SODIUM CHLORIDE, SODIUM LACTATE, POTASSIUM CHLORIDE, CALCIUM CHLORIDE 600; 310; 30; 20 MG/100ML; MG/100ML; MG/100ML; MG/100ML
50 INJECTION, SOLUTION INTRAVENOUS CONTINUOUS
Status: DISCONTINUED | OUTPATIENT
Start: 2020-11-09 | End: 2020-11-09 | Stop reason: HOSPADM

## 2020-11-09 RX ORDER — HYDROMORPHONE HYDROCHLORIDE 2 MG/ML
INJECTION, SOLUTION INTRAMUSCULAR; INTRAVENOUS; SUBCUTANEOUS AS NEEDED
Status: DISCONTINUED | OUTPATIENT
Start: 2020-11-09 | End: 2020-11-09 | Stop reason: HOSPADM

## 2020-11-09 RX ORDER — SUCCINYLCHOLINE CHLORIDE 20 MG/ML
INJECTION INTRAMUSCULAR; INTRAVENOUS AS NEEDED
Status: DISCONTINUED | OUTPATIENT
Start: 2020-11-09 | End: 2020-11-09 | Stop reason: HOSPADM

## 2020-11-09 RX ORDER — ROCURONIUM BROMIDE 10 MG/ML
INJECTION, SOLUTION INTRAVENOUS AS NEEDED
Status: DISCONTINUED | OUTPATIENT
Start: 2020-11-09 | End: 2020-11-09 | Stop reason: HOSPADM

## 2020-11-09 RX ORDER — ALBUMIN HUMAN 50 G/1000ML
SOLUTION INTRAVENOUS AS NEEDED
Status: DISCONTINUED | OUTPATIENT
Start: 2020-11-09 | End: 2020-11-09 | Stop reason: HOSPADM

## 2020-11-09 RX ORDER — ACETAMINOPHEN 325 MG/1
650 TABLET ORAL ONCE
Status: COMPLETED | OUTPATIENT
Start: 2020-11-09 | End: 2020-11-09

## 2020-11-09 RX ORDER — ACETAMINOPHEN 325 MG/1
650 TABLET ORAL
Status: DISCONTINUED | OUTPATIENT
Start: 2020-11-09 | End: 2020-11-11 | Stop reason: HOSPADM

## 2020-11-09 RX ORDER — BUPIVACAINE HYDROCHLORIDE AND EPINEPHRINE 2.5; 5 MG/ML; UG/ML
INJECTION, SOLUTION EPIDURAL; INFILTRATION; INTRACAUDAL; PERINEURAL AS NEEDED
Status: DISCONTINUED | OUTPATIENT
Start: 2020-11-09 | End: 2020-11-09 | Stop reason: HOSPADM

## 2020-11-09 RX ADMIN — PROPOFOL 25 MG: 10 INJECTION, EMULSION INTRAVENOUS at 13:10

## 2020-11-09 RX ADMIN — SODIUM CHLORIDE, SODIUM LACTATE, POTASSIUM CHLORIDE, AND CALCIUM CHLORIDE 50 ML/HR: 600; 310; 30; 20 INJECTION, SOLUTION INTRAVENOUS at 10:47

## 2020-11-09 RX ADMIN — METHYLENE BLUE 10 ML: 5 INJECTION INTRAVENOUS at 12:15

## 2020-11-09 RX ADMIN — ROCURONIUM BROMIDE 5 MG: 10 SOLUTION INTRAVENOUS at 11:05

## 2020-11-09 RX ADMIN — HYDROMORPHONE HYDROCHLORIDE 0.2 MG: 1 INJECTION, SOLUTION INTRAMUSCULAR; INTRAVENOUS; SUBCUTANEOUS at 14:00

## 2020-11-09 RX ADMIN — Medication 10 ML: at 16:30

## 2020-11-09 RX ADMIN — SODIUM CHLORIDE, POTASSIUM CHLORIDE, SODIUM LACTATE AND CALCIUM CHLORIDE: 600; 310; 30; 20 INJECTION, SOLUTION INTRAVENOUS at 13:30

## 2020-11-09 RX ADMIN — ONDANSETRON HYDROCHLORIDE 4 MG: 2 INJECTION, SOLUTION INTRAMUSCULAR; INTRAVENOUS at 11:10

## 2020-11-09 RX ADMIN — KETAMINE HYDROCHLORIDE 10 MG: 10 INJECTION, SOLUTION INTRAMUSCULAR; INTRAVENOUS at 11:43

## 2020-11-09 RX ADMIN — ACETAMINOPHEN 650 MG: 325 TABLET ORAL at 10:23

## 2020-11-09 RX ADMIN — Medication 2 G: at 11:10

## 2020-11-09 RX ADMIN — KETOROLAC TROMETHAMINE 15 MG: 30 INJECTION, SOLUTION INTRAMUSCULAR at 22:51

## 2020-11-09 RX ADMIN — LIDOCAINE HYDROCHLORIDE 80 MG: 20 INJECTION, SOLUTION EPIDURAL; INFILTRATION; INTRACAUDAL; PERINEURAL at 11:04

## 2020-11-09 RX ADMIN — HYDROMORPHONE HYDROCHLORIDE 0.2 MG: 1 INJECTION, SOLUTION INTRAMUSCULAR; INTRAVENOUS; SUBCUTANEOUS at 14:15

## 2020-11-09 RX ADMIN — FENTANYL CITRATE 25 MCG: 50 INJECTION, SOLUTION INTRAMUSCULAR; INTRAVENOUS at 14:19

## 2020-11-09 RX ADMIN — ACETAMINOPHEN 650 MG: 325 TABLET ORAL at 22:51

## 2020-11-09 RX ADMIN — ALBUMIN (HUMAN) 250 ML: 12.5 INJECTION, SOLUTION INTRAVENOUS at 12:22

## 2020-11-09 RX ADMIN — ACETAMINOPHEN 650 MG: 325 TABLET ORAL at 16:47

## 2020-11-09 RX ADMIN — ROCURONIUM BROMIDE 10 MG: 10 SOLUTION INTRAVENOUS at 12:15

## 2020-11-09 RX ADMIN — HYDROMORPHONE HYDROCHLORIDE 0.2 MG: 2 INJECTION, SOLUTION INTRAMUSCULAR; INTRAVENOUS; SUBCUTANEOUS at 13:41

## 2020-11-09 RX ADMIN — PROPOFOL 50 MG: 10 INJECTION, EMULSION INTRAVENOUS at 11:52

## 2020-11-09 RX ADMIN — Medication 3 MG: at 13:26

## 2020-11-09 RX ADMIN — HYDROMORPHONE HYDROCHLORIDE 0.4 MG: 2 INJECTION, SOLUTION INTRAMUSCULAR; INTRAVENOUS; SUBCUTANEOUS at 13:48

## 2020-11-09 RX ADMIN — PHENYLEPHRINE HYDROCHLORIDE 20 MCG/MIN: 10 INJECTION INTRAVENOUS at 11:14

## 2020-11-09 RX ADMIN — FENTANYL CITRATE 50 MCG: 50 INJECTION, SOLUTION INTRAMUSCULAR; INTRAVENOUS at 11:08

## 2020-11-09 RX ADMIN — HYDROMORPHONE HYDROCHLORIDE 0.4 MG: 2 INJECTION, SOLUTION INTRAMUSCULAR; INTRAVENOUS; SUBCUTANEOUS at 13:17

## 2020-11-09 RX ADMIN — ROCURONIUM BROMIDE 10 MG: 10 SOLUTION INTRAVENOUS at 11:28

## 2020-11-09 RX ADMIN — ROCURONIUM BROMIDE 10 MG: 10 SOLUTION INTRAVENOUS at 12:40

## 2020-11-09 RX ADMIN — SODIUM CHLORIDE, POTASSIUM CHLORIDE, SODIUM LACTATE AND CALCIUM CHLORIDE: 600; 310; 30; 20 INJECTION, SOLUTION INTRAVENOUS at 12:28

## 2020-11-09 RX ADMIN — KETOROLAC TROMETHAMINE 15 MG: 30 INJECTION, SOLUTION INTRAMUSCULAR at 16:47

## 2020-11-09 RX ADMIN — FENTANYL CITRATE 25 MCG: 50 INJECTION, SOLUTION INTRAMUSCULAR; INTRAVENOUS at 14:10

## 2020-11-09 RX ADMIN — GLYCOPYRROLATE 0.6 MG: 0.2 INJECTION, SOLUTION INTRAMUSCULAR; INTRAVENOUS at 13:26

## 2020-11-09 RX ADMIN — DEXMEDETOMIDINE HYDROCHLORIDE 5 MCG: 100 INJECTION, SOLUTION, CONCENTRATE INTRAVENOUS at 11:53

## 2020-11-09 RX ADMIN — ALBUMIN (HUMAN) 250 ML: 12.5 INJECTION, SOLUTION INTRAVENOUS at 12:42

## 2020-11-09 RX ADMIN — HYDROMORPHONE HYDROCHLORIDE 0.4 MG: 2 INJECTION, SOLUTION INTRAMUSCULAR; INTRAVENOUS; SUBCUTANEOUS at 13:10

## 2020-11-09 RX ADMIN — SODIUM CHLORIDE, POTASSIUM CHLORIDE, SODIUM LACTATE AND CALCIUM CHLORIDE: 600; 310; 30; 20 INJECTION, SOLUTION INTRAVENOUS at 10:52

## 2020-11-09 RX ADMIN — FENTANYL CITRATE 25 MCG: 50 INJECTION, SOLUTION INTRAMUSCULAR; INTRAVENOUS at 14:04

## 2020-11-09 RX ADMIN — HYDROMORPHONE HYDROCHLORIDE 0.4 MG: 2 INJECTION, SOLUTION INTRAMUSCULAR; INTRAVENOUS; SUBCUTANEOUS at 13:26

## 2020-11-09 RX ADMIN — KETAMINE HYDROCHLORIDE 20 MG: 10 INJECTION, SOLUTION INTRAMUSCULAR; INTRAVENOUS at 11:14

## 2020-11-09 RX ADMIN — KETAMINE HYDROCHLORIDE 10 MG: 10 INJECTION, SOLUTION INTRAMUSCULAR; INTRAVENOUS at 11:58

## 2020-11-09 RX ADMIN — PROPOFOL 150 MG: 10 INJECTION, EMULSION INTRAVENOUS at 11:05

## 2020-11-09 RX ADMIN — MIDAZOLAM 2 MG: 1 INJECTION INTRAMUSCULAR; INTRAVENOUS at 10:53

## 2020-11-09 RX ADMIN — DEXMEDETOMIDINE HYDROCHLORIDE 10 MCG: 100 INJECTION, SOLUTION, CONCENTRATE INTRAVENOUS at 11:55

## 2020-11-09 RX ADMIN — GLYCOPYRROLATE 0.2 MG: 0.2 INJECTION, SOLUTION INTRAMUSCULAR; INTRAVENOUS at 11:01

## 2020-11-09 RX ADMIN — GLYCOPYRROLATE 0.2 MG: 0.2 INJECTION, SOLUTION INTRAMUSCULAR; INTRAVENOUS at 11:19

## 2020-11-09 RX ADMIN — DEXAMETHASONE SODIUM PHOSPHATE 4 MG: 4 INJECTION, SOLUTION INTRAMUSCULAR; INTRAVENOUS at 11:10

## 2020-11-09 RX ADMIN — FENTANYL CITRATE 50 MCG: 50 INJECTION, SOLUTION INTRAMUSCULAR; INTRAVENOUS at 11:02

## 2020-11-09 RX ADMIN — HYDROMORPHONE HYDROCHLORIDE 0.2 MG: 2 INJECTION, SOLUTION INTRAMUSCULAR; INTRAVENOUS; SUBCUTANEOUS at 13:38

## 2020-11-09 RX ADMIN — DEXMEDETOMIDINE HYDROCHLORIDE 5 MCG: 100 INJECTION, SOLUTION, CONCENTRATE INTRAVENOUS at 11:10

## 2020-11-09 RX ADMIN — SUCCINYLCHOLINE CHLORIDE 120 MG: 20 INJECTION, SOLUTION INTRAMUSCULAR; INTRAVENOUS at 11:06

## 2020-11-09 RX ADMIN — KETAMINE HYDROCHLORIDE 10 MG: 10 INJECTION, SOLUTION INTRAMUSCULAR; INTRAVENOUS at 11:16

## 2020-11-09 RX ADMIN — DEXTROSE MONOHYDRATE AND SODIUM CHLORIDE 100 ML/HR: 5; .45 INJECTION, SOLUTION INTRAVENOUS at 14:15

## 2020-11-09 RX ADMIN — PROPOFOL 50 MG: 10 INJECTION, EMULSION INTRAVENOUS at 11:08

## 2020-11-09 RX ADMIN — FENTANYL CITRATE 25 MCG: 50 INJECTION, SOLUTION INTRAMUSCULAR; INTRAVENOUS at 13:59

## 2020-11-09 RX ADMIN — ROCURONIUM BROMIDE 25 MG: 10 SOLUTION INTRAVENOUS at 11:20

## 2020-11-09 NOTE — PROGRESS NOTES
TRANSFER - IN REPORT:    Verbal report received from [place] (name) on Сергей Lincoln  being received from PACU (unit) for routine post - op      Report consisted of patients Situation, Background, Assessment and   Recommendations(SBAR). Information from the following report(s) SBAR, Kardex, OR Summary, Intake/Output, MAR and Recent Results was reviewed with the receiving nurse. Opportunity for questions and clarification was provided. Assessment completed upon patients arrival to unit and care assumed.

## 2020-11-09 NOTE — ANESTHESIA POSTPROCEDURE EVALUATION
Post-Anesthesia Evaluation and Assessment    Patient: Laura Hinds MRN: [id number]  SSN: [id number]    YOB: 1965  Age: 62 y.o. Sex: male       Cardiovascular Function/Vital Signs  Visit Vitals  BP (!) 130/54   Pulse 64   Temp 36.7 °C (98 °F)   Resp 13   Wt 84 kg (185 lb 3 oz)   SpO2 99%   BMI 26.57 kg/m²       Patient is status post General anesthesia for Procedure(s):  RADICAL RETROPUBIC PROSTATECTOMY WITH PELVIC LYMPH NODE DISSECTION. Nausea/Vomiting: None    Postoperative hydration reviewed and adequate. Pain:  Pain Scale 1: Numeric (0 - 10) (11/09/20 1350)  Pain Intensity 1: 8 (11/09/20 1350)   Managed    Neurological Status:   Neuro (WDL): Within Defined Limits (11/09/20 1350)   At baseline    Mental Status and Level of Consciousness: Alert and oriented to person, place, and time    Pulmonary Status:   O2 Device: CO2 nasal cannula;Nasal cannula (11/09/20 1350)   Adequate oxygenation and airway patent    Complications related to anesthesia: None    Post-anesthesia assessment completed. No concerns    Signed By: Kelby Regalado MD     November 9, 2020              Procedure(s):  RADICAL RETROPUBIC PROSTATECTOMY WITH PELVIC LYMPH NODE DISSECTION. general    <BSHSIANPOST>    INITIAL Post-op Vital signs:   Vitals Value Taken Time   /54 11/9/2020  2:15 PM   Temp 36.7 °C (98 °F) 11/9/2020  1:50 PM   Pulse 61 11/9/2020  2:27 PM   Resp 21 11/9/2020  2:27 PM   SpO2 98 % 11/9/2020  2:27 PM   Vitals shown include unvalidated device data.

## 2020-11-09 NOTE — PROGRESS NOTES
3:10 PM: Patient arrived to floor at this time. Stated no pain, but feels drowsy/whoozy. Dual skin performed with Yarely Nuno RN, no skin issues noted. Wife currently at bedside. 5:00 PM: RN notified of patient having severe bladder spasms. Due to this RN being occupied in another room, Rikki Funez RN, assessed and Rachele Baez MD, regarding patient not having any bladder spasm medication on board. Gissel Salas, gave patient toradol and tylenol per Aileen Severs, MD. RN assessed patient after receiving medications and he stated he feels a lot better. Assisted patient up to ambulate and patient ambulated 1 lap around unit with wife. 8:11 PM: Patient resting in bed with wife still at bedside. Has ambulated multiple times around unit and tolerating activity well. Tolerating clear liquid diet. Emptied 60ml of sanguinous fluid from JEREMIAH. Emptied 200ml of green colored urine, d/t dye from surgery, from tan. Dressing on midline incision still clean, dry and intact.

## 2020-11-09 NOTE — BRIEF OP NOTE
Brief Postoperative Note    Patient: Yoli Can  YOB: 1965  MRN: [id number]    Date of Procedure: 11/9/2020     Pre-Op Diagnosis: PROSTATE CANCER    Post-Op Diagnosis: Same as preoperative diagnosis.       Procedure(s):  RADICAL RETROPUBIC PROSTATECTOMY WITH PELVIC LYMPH NODE DISSECTION    Surgeon(s):  Emma Potts MD    Surgical Assistant: Surg Asst-1: Bibi Liriano    Anesthesia: General     Estimated Blood Loss (mL): 102    Complications: None    Specimens:   ID Type Source Tests Collected by Time Destination   1 : Right pelvic lymph node Fresh Lymph Node  Emma Potts MD 11/9/2020 1151 Pathology   2 : left pelvic lymph node Fresh Lymph Node  Emma Potts MD 11/9/2020 1204 Pathology   3 : Prostate Fresh Prostate  Emma Potts MD 11/9/2020 1242 Pathology        Implants: * No implants in log *    Drains: * No LDAs found *    Findings: no epe noted    Electronically Signed by Elder Root MD on 11/9/2020 at 1:40 PM

## 2020-11-09 NOTE — PROGRESS NOTES
4:40 PM  Patient complaining of severe bladder spasms, Armani Brooks MD paged and stated to try the toradol and tylenol first, if no relief then he can have a B&O suppository.

## 2020-11-09 NOTE — ROUTINE PROCESS
Patient: Minh Miguel MRN: [id number]  SSN: [id number]   YOB: 1965  Age: 62 y.o. Sex: male     Patient is status post Procedure(s):  RADICAL RETROPUBIC PROSTATECTOMY WITH PELVIC LYMPH NODE DISSECTION.     Surgeon(s) and Role:     * Dex Zuniga MD - Primary    Local/Dose/Irrigation: 50 ml local given, see MAR                  Peripheral IV 11/09/20 Left;Posterior Forearm (Active)                           Dressing/Packing:  Wound Abdomen Mid Incision and drain site-Dressing/Treatment: Gauze dressing/dressing sponge;Tape/Soft cloth adhesive tape (11/09/20 1323)    Splint/Cast:  ]    Other:

## 2020-11-09 NOTE — PERIOP NOTES
TRANSFER - OUT REPORT:    Verbal report given to Jossie Castro RN(name) on [employer]  being transferred to 5E(unit) for routine post - op       Report consisted of patients Situation, Background, Assessment and   Recommendations(SBAR). Time Pre op antibiotic given:1110  Anesthesia Stop time: 6792  Temple Present on Transfer to floor:yes  Order for Temple on Chart:yes  Discharge Prescriptions with Chart:no    Information from the following report(s) SBAR, Procedure Summary, Intake/Output and MAR was reviewed with the receiving nurse. Opportunity for questions and clarification was provided. Is the patient on 02? NO         Is the patient on a monitor? NO    Is the nurse transporting with the patient? NO    Surgical Waiting Area notified of patient's transfer from PACU? YES, s/w wife Annamaria Bruner [phone]      The following personal items collected during your admission accompanied patient upon transfer:   Dental Appliance: Dental Appliances: None  Vision: Visual Aid: None  Hearing Aid:    Jewelry:    Clothing: Clothing:  At bedside  Other Valuables:    Valuables sent to safe:

## 2020-11-10 LAB
CREAT FLD-MCNC: 0.87 MG/DL
HCT VFR BLD AUTO: 34.9 % (ref 36.6–50.3)
HGB BLD-MCNC: 11.8 G/DL (ref 12.1–17)
SPECIMEN SOURCE FLD: NORMAL

## 2020-11-10 PROCEDURE — 65270000032 HC RM SEMIPRIVATE

## 2020-11-10 PROCEDURE — 85018 HEMOGLOBIN: CPT

## 2020-11-10 PROCEDURE — 36415 COLL VENOUS BLD VENIPUNCTURE: CPT

## 2020-11-10 PROCEDURE — 74011250636 HC RX REV CODE- 250/636: Performed by: UROLOGY

## 2020-11-10 PROCEDURE — 74011250637 HC RX REV CODE- 250/637: Performed by: UROLOGY

## 2020-11-10 PROCEDURE — 74011000258 HC RX REV CODE- 258: Performed by: UROLOGY

## 2020-11-10 PROCEDURE — 82570 ASSAY OF URINE CREATININE: CPT

## 2020-11-10 RX ORDER — CEPHALEXIN 500 MG/1
500 CAPSULE ORAL 4 TIMES DAILY
Qty: 20 CAP | Refills: 0 | Status: SHIPPED | OUTPATIENT
Start: 2020-11-10 | End: 2020-11-15

## 2020-11-10 RX ORDER — OXYBUTYNIN CHLORIDE 10 MG/1
10 TABLET, EXTENDED RELEASE ORAL DAILY
Qty: 10 TAB | Refills: 1 | Status: SHIPPED | OUTPATIENT
Start: 2020-11-10 | End: 2020-12-11 | Stop reason: ALTCHOICE

## 2020-11-10 RX ADMIN — ACETAMINOPHEN 650 MG: 325 TABLET ORAL at 11:09

## 2020-11-10 RX ADMIN — KETOROLAC TROMETHAMINE 15 MG: 30 INJECTION, SOLUTION INTRAMUSCULAR at 11:08

## 2020-11-10 RX ADMIN — ACETAMINOPHEN 650 MG: 325 TABLET ORAL at 23:27

## 2020-11-10 RX ADMIN — ACETAMINOPHEN 650 MG: 325 TABLET ORAL at 17:19

## 2020-11-10 RX ADMIN — KETOROLAC TROMETHAMINE 15 MG: 30 INJECTION, SOLUTION INTRAMUSCULAR at 04:53

## 2020-11-10 RX ADMIN — ACETAMINOPHEN 650 MG: 325 TABLET ORAL at 04:53

## 2020-11-10 RX ADMIN — DEXTROSE MONOHYDRATE AND SODIUM CHLORIDE 100 ML/HR: 5; .45 INJECTION, SOLUTION INTRAVENOUS at 00:51

## 2020-11-10 RX ADMIN — DEXTROSE MONOHYDRATE AND SODIUM CHLORIDE 100 ML/HR: 5; .45 INJECTION, SOLUTION INTRAVENOUS at 20:53

## 2020-11-10 NOTE — OP NOTES
[address]   OPERATIVE REPORT    Name:  Stephani Ruiz  MR#:  [id number]  :  1965  ACCOUNT #:  [de-identified]  DATE OF SERVICE:  2020    PREOPERATIVE DIAGNOSIS:  Prostate cancer. POSTOPERATIVE DIAGNOSIS:  Prostate cancer. PROCEDURES PERFORMED:  Nerve-sparing radical retropubic prostatectomy and bilateral pelvic lymphadenectomy. SURGEON:  Steve Gaston MD    ASSISTANT:  Ana Reyez SA    ANESTHESIA:  General.    COMPLICATIONS:  None. SPECIMENS REMOVED:  Prostate lymph nodes. IMPLANTS:  None. ESTIMATED BLOOD LOSS:  550 mL. PROCEDURE:  After anesthesia, midline incision was made below the umbilicus. The retropubic space was dissected and a bilateral pelvic lymphadenectomy was done. The limits included the external iliac vein, the obturator nerve, the femoral canal and hypogastric vessels. Lymphatics were controlled with hemoclips. The endopelvic fascia was cleared and opened on both sides. The dorsal venous bundle was sutured, oversewn, and divided. The apex of the prostate was carefully dissected and released from surrounding structures. The nerve bundles were encased in some periprostatic inflammatory tissue and adherent to the surface of the prostate. Using sharp dissection, the right neurovascular bundle was sharply dissected away from the prostate and protected. On the left side, similar maneuver was made; however, at the level of the left apex, a wider resection was carried out, which likely involved the medial fibers of the left neurovascular bundle and this was done to avoid any evidence of extraprostatic extension. This was done to avoid incompletely resected tumor, which at that level could potentially have been extraprostatic. Once the nerve bundles were lateralized on both sides, the urethra was transected at its junction with the prostate. There was a nice urethral stump. There was no apical split.   The nerves were then dissected away from the mid gland and base and the lateral pedicles were clipped and divided. Denonvilliers' fascia was opened in the midline. The vas was clipped and divided on both sides. The seminal vesicles were delivered completely with the specimen. The prostate was taken off the bladder neck with a wide margin and this included a moderately sized median lobe. The ureteral orifices were identified and protected and the bladder was repaired with 3-0 Vicryl coming up from 6 o'clock to mucosa. The bladder was turned out with interrupted 3-0 Vicryls. The nerve bundles appeared to be intact on both sides and examination of the specimen did not reveal any clear-cut evidence of residual nerve fibers even on the left side; however, see above. At this point, a 5-suture direct anastomosis was done over a 20-Bulgarian Temple and there was good mucosa to mucosa apposition. A 19-Holden drain was brought out on the left side and sutured. Marcaine was injected in the fascia and the skin. Skin edges were closed with skin staples. The fascia was closed with a running #1 Vicryl. Operating time was 1 hour and 58 minutes.         MD QUINTON Lanza/[email or web address]  D:  11/09/2020 13:55  T:  11/09/2020 21:32  JOB #:  [id number]  CC:  [address] [place] [address]

## 2020-11-10 NOTE — PROGRESS NOTES
2343: Pt walked 3 full laps on [address]. Pt up ad amada. Tolerated walk well. Pt stating that his bladder discomfort feels better when he is up walking opposed to laying in the bed. Pt has has 2 cups of water, a cup of chicken broth and a popsicle since shift began. Will continue to monitor. 6125: Pt walked another 3 laps this AM. Pt's tan has put out 2750 throughout shift. Turning from green to yellow, clear. Pt's julien has put out 215 of sero-serosang. Liquid. Pt has had another cup of water. Pt expressing hopes of going home today but has not passed gas yet, only burping. Pt receiving prn tylenol and toradol combo Q6. Pt resting in bed at this time. Bedside shift change report given to VANESSA Flores RN (oncoming nurse) by Anusha Farrell RN (offgoing nurse). Report included the following information SBAR, Kardex, Intake/Output, MAR and Recent Results.

## 2020-11-10 NOTE — DISCHARGE INSTRUCTIONS
Patient Discharge Instructions    Floydene Felty / [id number] : 1965    Admitted 2020 Discharged: 11/10/2020     Take Home Medications       · It is important that you take the medication exactly as they are prescribed. · Keep your medication in the bottles provided by the pharmacist and keep a list of the medication names, dosages, and times to be taken in your wallet. · Do not take other medications without consulting your doctor. What to do at Home      Recommended activity: No Lifting for 6 weeks. Follow-up with [facility]. Call for an appointment (if not already scheduled)            [phone]. Information obtained by :  I understand that if any problems occur once I am at home I am to contact my physician. I understand and acknowledge receipt of the instructions indicated above.                                                                                                                                            Physician's or R.N.'s Signature                                                                  Date/Time                                                                                                                                              Patient or Representative Signature                                                          Date/Time

## 2020-11-10 NOTE — PROGRESS NOTES
10:00 AM: Orders received and sent to lab for creatinine sample from JEREMIAH fluid. Patient up in chair at this time. Complaining of some bloating feeling in abdomen. Per Michael Laboy MD, patient to slow down on oral fluid intake d/t bloating sensation in abdomen. 5:55 PM: Patient has been ambulating multiple laps around unit on own and with wife. Currently up in chair eating dinner and planning to ambulate after. Still feeling a little bloated in abdomen. Has not passed gas yet. Pain well controlled with PRN toradol and tylenol. 7:30 PM: Wife asked RN to take patients temperature as she thought patient had a fever. Temp was 98.9, RN encouraged use of incentive at this time as patient stated he used it only about 5 times today. 8:32 PM: Patient received PRN dose of just tylenol and stated it worked well to control pain. Still not passing gas. Still ambulating multiple laps at a time around unit. Emptied a total of 100ml of serosanguineous fluid from JEREMIAH. Emptied a total of 2375ml from tan, yellow/straw during AM assessment and now tea-colored.

## 2020-11-10 NOTE — PROGRESS NOTES
Bedside and Verbal shift change report given to Ezequiel Meraz RN (oncoming nurse) by [employer], RN (offgoing nurse). Report included the following information SBAR, Kardex, OR Summary, Procedure Summary, Intake/Output, MAR and Recent Results.

## 2020-11-11 VITALS
RESPIRATION RATE: 16 BRPM | OXYGEN SATURATION: 99 % | SYSTOLIC BLOOD PRESSURE: 132 MMHG | HEART RATE: 57 BPM | WEIGHT: 185.19 LBS | DIASTOLIC BLOOD PRESSURE: 66 MMHG | TEMPERATURE: 97.6 F | BODY MASS INDEX: 26.57 KG/M2

## 2020-11-11 LAB
COMMENT, HOLDF: NORMAL
HCT VFR BLD AUTO: 35 % (ref 36.6–50.3)
HGB BLD-MCNC: 11.6 G/DL (ref 12.1–17)
SAMPLES BEING HELD,HOLD: NORMAL

## 2020-11-11 PROCEDURE — 74011250637 HC RX REV CODE- 250/637: Performed by: UROLOGY

## 2020-11-11 PROCEDURE — 74011000258 HC RX REV CODE- 258: Performed by: UROLOGY

## 2020-11-11 PROCEDURE — 85018 HEMOGLOBIN: CPT

## 2020-11-11 RX ADMIN — DEXTROSE MONOHYDRATE AND SODIUM CHLORIDE 100 ML/HR: 5; .45 INJECTION, SOLUTION INTRAVENOUS at 04:45

## 2020-11-11 RX ADMIN — ACETAMINOPHEN 650 MG: 325 TABLET ORAL at 06:19

## 2020-11-11 RX ADMIN — ACETAMINOPHEN 650 MG: 325 TABLET ORAL at 12:36

## 2020-11-11 NOTE — PROGRESS NOTES
Temple teaching completed at this time along with discharge instructions. The patient scripts given. The patient had no questions at this time. Patient to be driven home by his wife at this time.

## 2020-11-11 NOTE — ROUTINE PROCESS
Bedside shift change report given to Candis RN (oncoming nurse) by Celeste Arriola RN (offgoing nurse). Report included the following information SBAR, Kardex, Intake/Output, MAR and Recent Results.

## 2020-11-11 NOTE — PROGRESS NOTES
Problem: Falls - Risk of  Goal: *Absence of Falls  Description: Document Renan Going Fall Risk and appropriate interventions in the flowsheet.   Outcome: Progressing Towards Goal  Note: Fall Risk Interventions:            Medication Interventions: Patient to call before getting OOB, Teach patient to arise slowly    Elimination Interventions: Call light in reach

## 2020-11-11 NOTE — PROGRESS NOTES
Patient given discharge teaching. Waiting for H&H to result. 11:43 AM  Spoke with lab at this time. Processing has not received a label.  calling to obtain label at this time.

## 2020-11-11 NOTE — PROGRESS NOTES
Problem: Falls - Risk of  Goal: *Absence of Falls  Description: Document Krystina Le Fall Risk and appropriate interventions in the flowsheet.   11/11/2020 1002 by Carie Garcia  Outcome: Resolved/Met  11/11/2020 1002 by Carie Garcia  Outcome: Progressing Towards Goal  Note: Fall Risk Interventions:            Medication Interventions: Patient to call before getting OOB, Teach patient to arise slowly    Elimination Interventions: Call light in reach              Problem: Patient Education: Go to Patient Education Activity  Goal: Patient/Family Education  Outcome: Resolved/Met

## 2020-11-11 NOTE — PROGRESS NOTES
Bedside and Verbal shift change report given to Vandana Luna RN (oncoming nurse) by [employer], RN (offgoing nurse). Report included the following information SBAR, Kardex, Intake/Output, MAR and Recent Results.

## 2020-11-11 NOTE — PROGRESS NOTES
Spoke with lab as a lav and green top have been sent but no H&H has resulted. This RN to send order of H&H so they can process the blood at this time. 10:04 AM  Spoke with lab again as H&H has not resulted at this time. Awaiting H&H for discharge. 10:48 AM  Spoke with lab again at this time. Labs needed to changed to add-on. Awaiting lab to result at this time.

## 2020-11-12 ENCOUNTER — PATIENT OUTREACH (OUTPATIENT)
Dept: CASE MANAGEMENT | Age: 55
End: 2020-11-12

## 2020-11-12 NOTE — ACP (ADVANCE CARE PLANNING)
Advance Care Planning:   Does patient have an Advance Directive:  currently not on file; patient declined education

## 2020-11-12 NOTE — PROGRESS NOTES
Patient was admitted to [facility] on  and discharged on  for prostate cancer- s/p prostatectomy. Outreach made within 2 business days of discharge: Yes    Top Discharge Challenges to be reviewed by the provider   Additional needs identified to be addressed with provider no  none  Discussed COVID-19 related testing which was not done at this time. Test results were not done. Method of communication with provider : chart routing       Advance Care Planning:   Does patient have an Advance Directive:  currently not on file; patient declined education    Inpatient Readmission Risk score: na  Was this a readmission? no  Patient stated reason for the admission: scheduled prostatectomy  Patients top risk factors for readmission: medical condition  Interventions to address risk factors: reviewed discharge instructions with patient, reviewed meds- teaching on new meds, reviewed red flags: increase blood in catheter, pain, decrease in urination, fever,nausea,vomiting,diarrhea. Given info on [employer] as resource, given CTN contact information. Care Transition Nurse (CTN) contacted the patient by telephone to perform post hospital discharge assessment. Verified name and  with patient as identifiers. Provided introduction to self, and explanation of the CTN role. Patient reports he is doing ok. Up walking around house. Had some discomfort during the night- bladder felt irritated. Walking helped. Better this am. Reports managing urinary catheter- urine mostly clear. CTN reviewed discharge instructions, medical action plan and red flags with patient who verbalized understanding. Patient given an opportunity to ask questions and does not have any further questions or concerns at this time. The patient agrees to contact the PCP office for questions related to their healthcare.      Medication reconciliation was performed with patient, who verbalizes understanding of administration of home medications. Advised obtaining a 90-day supply of all daily and as-needed medications. Referral to Pharm D needed: no     Home Health/Outpatient orders at discharge: [address]: na  Date of initial visit: na    Durable Medical Equipment ordered at discharge: none  [address] received: na    Covid Risk Education    Patient has following risk factors of: no known risk factors. Education provided regarding infection prevention, and signs and symptoms of COVID-19 and when to seek medical attention with patient who verbalized understanding. Discussed exposure protocols and quarantine From CDC: Are you at higher risk for severe illness?  and given an opportunity for questions and concerns. The patient agrees to contact the COVID-19 hotline [phone] or PCP office for questions related to COVID-19. For more information on steps you can take to protect yourself, see CDC's How to Protect Yourself     Patient/family/caregiver given information for [employer] and agrees to enroll no  Patient's preferred e-mail: declines  Patient's preferred phone number: declines    Discussed follow-up appointments. If no appointment was previously scheduled, appointment scheduling offered: yes  [facility] follow up appointment(s): No future appointments. Declined JENNIFER with pcp at this time. [facility] follow up appointment(s): Dr.William El,urologist= 11/20   Plan for follow-up call in 7-10 days based on severity of symptoms and risk factors. CTN provided contact information for future needs. Goals Addressed                 This Visit's Progress     Prevent complications post hospitalization. 11/12/20 [facility] 11/9-11/11 dx- prostate cancer, s/p prostatectomy   Reviewed discharge instructions with patient   Reviewed meds- education on new meds.    Reviewed red flags: fever,nausea,vomiting,diarrhea,sob,increase in blood in urinary catheter, decrease in urination,    Declined JENNIFER with pcp   F/u with Steve Bassett, 11/20.  Given information on [facility] as resource.  Given CTN contact information if any questions/concerns.    Advised CTN to check back in about a week, sooner [email or web address] teresa

## 2020-11-17 NOTE — ADT AUTH CERT NOTES
Prostatectomy, Radical - Care Day 2 (11/10/2020) by Anel Rojas RN         Review Status  Review Entered    Completed  11/10/2020 14:19        Criteria Review       Care Day: 2 Care Date: 11/10/2020 Level of Care: Inpatient Floor    Guideline Day 2    Level Of Care    (X) Floor to discharge [G]    11/10/2020 14:19:15 EST by Charlie Flowers      surgical    Clinical Status    (X) * Procedure completed    11/10/2020 14:19:15 EST by Charlie Flowers      POD 1  RADICAL RETROPUBIC PROSTATECTOMY WITH PELVIC LYMPH NODE DISSECTION    (X) * Hemodynamic stability    11/10/2020 14:19:15 EST by Charlie Flowers      Vitals: 98.4, HR 64, RR 16, /56, 96% on RA    ( ) * No active urinary or pelvic bleeding    (X) * No evidence of postoperative or surgical site infection    11/10/2020 14:19:15 EST by Charlie Flowers      vss af abd soft    ( ) * Sufficient urine output    ( ) * Limited drain output [H]    ( ) * Catheter and drain management arranged [I]    ( ) * Pain absent or managed    ( ) * Discharge plans and education understood    Activity    (X) * Ambulatory or acceptable for next level of care    11/10/2020 14:19:15 EST by Charlie Flowers      ambulate with assist    Routes    (X) * Oral hydration, medications, and diet    11/10/2020 14:19:15 EST by Charlie Flowers      clear liquid diet    Interventions    (X) Hgb/Hct    11/10/2020 14:19:15 EST by Charlie Flowers      HGB: 11.8 (L)  HCT: 34.9 (L)    (X) Urethral catheter    11/10/2020 14:19:15 EST by Charlie Flowers      tan    Medications    (X) * PCA and epidural analgesics absent [J]    11/10/2020 14:19:15 EST by Charlie Flowers      no PCA    * Milestone    Additional Notes    11/10/2020    LOC - IP - telemetry    Urology note:    vss af abd soft. No flatus. Plan julien creat.  Walk.  Avoid narcotics.  Dc when bowel opens.         Vitals: 98.4, HR 64, RR 16, /56, 96% on RA          Fluid Type[de-identified] LINNETTE NELSON    Creatinine, fluid: 0.87 Meds:    Tylenol 650mg PO q6h PRn x 2    D5 ½ NS 100ml/hr    Toradol 15mg IV q6h PRn x 2       Plan: clear liquid diet, ambulate with assist, SCDS, IS, I&O, JEREMIAH to bulb, wound care             DISCHARGE    Patient Demographics     Patient Name   Avtar Emerson   [id number]  Sex   Male     1965  Address   [address] [place] [id number]  Phone   [phone] (Home)   [phone] (Mobile) *Preferred*    Discharge Information     Discharge Provider  Date/Time  Disposition  Destination    Ailyn Bull MD / [phone]  20 1301  [facility]    Comments    (none)    Discharge Summary Notes     No notes of this type exist for this encounter.

## 2020-11-27 NOTE — ADT AUTH CERT NOTES
PLEASE REVIEW FOR CONTINUED STAY- 11/10 CLINICAL AND 11/11 DISCHARGE   +            Prostatectomy, Radical - Care Day 2 (11/10/2020) by Caitlin Sarmiento RN         Review Status    Completed        Criteria Review       Care Day: 2 Care Date: 11/10/2020 Level of Care: Inpatient Floor    Guideline Day 2    Level Of Care    (X) Floor to discharge [G]    11/10/2020 14:19:15 EST by Gregoria Jerry      surgical    Clinical Status    (X) * Procedure completed    11/10/2020 14:19:15 EST by Gregoria Jerry      POD 1  RADICAL RETROPUBIC PROSTATECTOMY WITH PELVIC LYMPH NODE DISSECTION    (X) * Hemodynamic stability    11/10/2020 14:19:15 EST by Gregoria Jerry      Vitals: 98.4, HR 64, RR 16, /56, 96% on RA    ( ) * No active urinary or pelvic bleeding    (X) * No evidence of postoperative or surgical site infection    11/10/2020 14:19:15 EST by Capri Griffith af abd soft    ( ) * Sufficient urine output    ( ) * Limited drain output [H]    ( ) * Catheter and drain management arranged [I]    ( ) * Pain absent or managed    ( ) * Discharge plans and education understood    Activity    (X) * Ambulatory or acceptable for next level of care    11/10/2020 14:19:15 EST by Gregoria Jerry      ambulate with assist    Routes    (X) * Oral hydration, medications, and diet    11/10/2020 14:19:15 EST by David Morin clear liquid diet    Interventions    (X) Hgb/Hct    11/10/2020 14:19:15 EST by Gregoria Jerry      HGB: 11.8 (L)  HCT: 34.9 (L)    (X) Urethral catheter    11/10/2020 14:19:15 EST by Gregoria Jerry      tan    Medications    (X) * PCA and epidural analgesics absent [J]    11/10/2020 14:19:15 EST by David Morin no PCA    * Milestone    Additional Notes    11/10/2020    LOC - IP - telemetry    Urology note:    vss af abd soft. No flatus. Plan julien creat.  Walk.  Avoid narcotics.  Dc when bowel opens.         Vitals: 98.4, HR 64, RR 16, /56, 96% on RA Fluid Type[de-identified] LINNETTE NELSON    Creatinine, fluid: 0.87       Meds:    Tylenol 650mg PO q6h PRn x 2    D5 ½ NS 100ml/hr    Toradol 15mg IV q6h PRn x 2       Plan: clear liquid diet, ambulate with assist, SCDS, IS, I&O, JEREMIAH to bulb, wound care           Patient Demographics     Patient Name   Katt Watkins   [id number]  Sex   Male     1965  Address   [address] [place] [id number]  Phone   [phone] (Home)   [phone] (Mobile) *Preferred*    Discharge Information     Discharge Provider  Date/Time  Disposition  Destination    Sixto Cooper MD / [phone]  20 1301  Home or [facility] 50    Comments    (none)    Discharge Summary Notes     No notes of this type exist for this encounter.

## 2020-12-04 NOTE — DISCHARGE SUMMARY
[facility] SUMMARY    Name:  Jeffery Richmond  MR#:  [id number]  :  1965  ACCOUNT #:  [de-identified]  ADMIT DATE:  2020  DISCHARGE DATE:  2020      ADMITTING DIAGNOSIS:  Prostate cancer. DISCHARGE DIAGNOSIS:  Prostate cancer. PROCEDURE:  Radical prostatectomy. BRIEF SUMMARY:  This gentleman underwent the above operation on 2020. His recovery was uneventful. His bowel function returned. His pain was controlled. His hemoglobin was stable and he was ready to be discharged on oral abiotics with plans to go to his home. Office followup was arranged. CONDITION AT DISCHARGE:  Stable. DISCHARGE MEDICATION:  Included antibiotics.       Meenu Frias MD      [id number]  D:  2020 11:40  T:  2020 0:34  JOB #:  [id number]  CC:  [address] [place] [address]

## 2020-12-05 LAB
25(OH)D3+25(OH)D2 SERPL-MCNC: 27.9 NG/ML (ref 30–100)
ALBUMIN SERPL-MCNC: 4.3 G/DL (ref 3.8–4.9)
ALP SERPL-CCNC: 50 IU/L (ref 39–117)
ALT SERPL-CCNC: 12 IU/L (ref 0–44)
AST SERPL-CCNC: 11 IU/L (ref 0–40)
BILIRUB DIRECT SERPL-MCNC: 0.19 MG/DL (ref 0–0.4)
BILIRUB SERPL-MCNC: 0.8 MG/DL (ref 0–1.2)
CHOLEST SERPL-MCNC: 138 MG/DL (ref 100–199)
HDLC SERPL-MCNC: 49 MG/DL
INTERPRETATION, 910389: NORMAL
LDLC SERPL CALC-MCNC: 76 MG/DL (ref 0–99)
PROT SERPL-MCNC: 6.1 G/DL (ref 6–8.5)
TRIGL SERPL-MCNC: 61 MG/DL (ref 0–149)
VLDLC SERPL CALC-MCNC: 13 MG/DL (ref 5–40)

## 2020-12-08 ENCOUNTER — PATIENT OUTREACH (OUTPATIENT)
Dept: CASE MANAGEMENT | Age: 55
End: 2020-12-08

## 2020-12-11 ENCOUNTER — OFFICE VISIT (OUTPATIENT)
Dept: INTERNAL MEDICINE CLINIC | Age: 55
End: 2020-12-11
Payer: COMMERCIAL

## 2020-12-11 ENCOUNTER — TELEPHONE (OUTPATIENT)
Dept: INTERNAL MEDICINE CLINIC | Age: 55
End: 2020-12-11

## 2020-12-11 VITALS
TEMPERATURE: 97.6 F | DIASTOLIC BLOOD PRESSURE: 58 MMHG | SYSTOLIC BLOOD PRESSURE: 108 MMHG | HEART RATE: 52 BPM | OXYGEN SATURATION: 100 % | HEIGHT: 70 IN | BODY MASS INDEX: 26.48 KG/M2 | WEIGHT: 185 LBS | RESPIRATION RATE: 16 BRPM

## 2020-12-11 DIAGNOSIS — K29.00 ACUTE GASTRITIS WITHOUT HEMORRHAGE, UNSPECIFIED GASTRITIS TYPE: ICD-10-CM

## 2020-12-11 DIAGNOSIS — F41.8 ANXIETY ABOUT HEALTH: ICD-10-CM

## 2020-12-11 DIAGNOSIS — R07.9 CHEST PAIN, UNSPECIFIED TYPE: Primary | ICD-10-CM

## 2020-12-11 DIAGNOSIS — K20.90 ESOPHAGITIS: ICD-10-CM

## 2020-12-11 PROCEDURE — 99214 OFFICE O/P EST MOD 30 MIN: CPT | Performed by: NURSE PRACTITIONER

## 2020-12-11 RX ORDER — ACETAMINOPHEN 500 MG
2000 TABLET ORAL DAILY
COMMUNITY

## 2020-12-11 RX ORDER — DOCUSATE SODIUM 100 MG/1
100 CAPSULE, LIQUID FILLED ORAL DAILY
COMMUNITY

## 2020-12-11 RX ORDER — OMEPRAZOLE 40 MG/1
40 CAPSULE, DELAYED RELEASE ORAL DAILY
Qty: 30 CAP | Refills: 1 | Status: SHIPPED | OUTPATIENT
Start: 2020-12-11 | End: 2020-12-14 | Stop reason: ALTCHOICE

## 2020-12-11 RX ORDER — SUCRALFATE 1 G/1
1 TABLET ORAL 2 TIMES DAILY
Qty: 20 TAB | Refills: 0 | Status: SHIPPED | OUTPATIENT
Start: 2020-12-11 | End: 2020-12-21

## 2020-12-11 RX ORDER — TADALAFIL 5 MG/1
5 TABLET ORAL DAILY
COMMUNITY
End: 2020-12-14 | Stop reason: SINTOL

## 2020-12-11 RX ORDER — CETIRIZINE HCL 10 MG
1 TABLET ORAL DAILY
COMMUNITY

## 2020-12-12 ENCOUNTER — TELEPHONE (OUTPATIENT)
Dept: INTERNAL MEDICINE CLINIC | Age: 55
End: 2020-12-12

## 2020-12-12 NOTE — PROGRESS NOTES
Celestine Valdez is a 60 y.o. male who was seen in clinic today (12/11/2020) for an acute visit. Assessment & Plan:     Diagnoses and all orders for this visit:    1. Chest pain, unspecified type  -     AMB POC EKG ROUTINE W/ 12 LEADS, INTER & REP    2. Esophagitis  -     omeprazole (PRILOSEC) 40 mg capsule; Take 1 Cap by mouth daily. -     sucralfate (Carafate) 1 gram tablet; Take 1 Tab by mouth two (2) times a day for 10 days. 3. Acute gastritis without hemorrhage, unspecified gastritis type  -     omeprazole (PRILOSEC) 40 mg capsule; Take 1 Cap by mouth daily. -     sucralfate (Carafate) 1 gram tablet; Take 1 Tab by mouth two (2) times a day for 10 days. 4. Anxiety about health    No acute findings on exam. Low mid sternal chest pain without associated s/s clinically presents as secondary to esophagitis/gastritis. EKG in office today showed sinus bradycardia without ectopy or ST changes (lead tracing during visit showed as sinus rhythm rate 48-69). EKG unchanged as compared to his previous EKG in October. Will place him on 10 day course of Carafate 1 GM BID-educated about med-dose away from other meds and will dissolve or crush in applesauce to help coat esophagus. Will also place on 1-2 month course of daily Prilosec 40 mg-educated about med. Advised to change chewable ASA to enteric coated 81 mg. Red flags reviewed with him that if were to occur would require ER care. Anxiety about health-counseling and support given. Reviewed status with his PCP-Dr. Marine Giron per patient's request.    Total time: 28 minutes    Follow-up and Dispositions    · Return if symptoms worsen or fail to improve. Subjective:   Lul was seen today for Chest Pain  Pt thinks pain is esophageal type pain-started last evening immediately after he had eaten some dry toast-states felt like he should have had a drink of something to wash it down. Denies difficulty swallowing or known reflux. Admits some burping this morning. Pain is low mid sternal to epigastric region-no radiation of pain to other areas. Pain worsened as he laid down and during the night. He ate some oatmeal for breakfast and pain seemed to lessen some with eating. Pain level currently #2-3/10. Has not taken any OTC medication such as NSAID, TUMS, or Prilosec. Pt voiced significant anxiety about health-states just a few short months ago was not on hardly any medication-then prostate surgery and multiple meds added for cardiovascular risks. Denies fever, chills, fatigue, weakness, diaphoresis, congestion, SOB or difficulty breathing, N/V/D/C, Abdominal pain,  complaints, back/flank pain, leg swelling, rash or skin changes. Pt states recent hx of normal (no blockages) stress test on 10/23/2020 with Dr. Akua Schaeffer. States he had performed d/t hx of family heart disease, personal elevated Calcium levels, and high cholesterol. He is on Simvastatin 40 mg daily and ASA 81 mg daily (admits ASA is not enteric coated). Prior to Admission medications    Medication Sig Start Date End Date Taking? Authorizing Provider   cholecalciferol (VITAMIN D3) (2,000 UNITS /50 MCG) cap capsule Take 2,000 Units by mouth daily. Yes Provider, Historical   tadalafiL (CIALIS) 5 mg tablet Take 5 mg by mouth daily. Yes Provider, Historical   docusate sodium (COLACE) 100 mg capsule Take 100 mg by mouth two (2) times a day. Yes Provider, Historical   cetirizine (ZYRTEC) 10 mg tablet Take 1 Tab by mouth daily. Yes Provider, Historical   aspirin delayed-release 81 mg tablet Take  by mouth daily. Yes Provider, Historical   simvastatin (ZOCOR) 40 mg tablet Take 1 Tab by mouth nightly. 8/24/20  Yes Sonya Leon MD          Allergies   Allergen Reactions    Bactrim [Sulfamethoprim] Swelling           ROS - per HPI        Objective:   Physical Exam  Vitals signs reviewed. Constitutional:       General: He is not in acute distress. Appearance: He is well-groomed.  He is not ill-appearing. Comments: Color pink, warm, and dry. HENT:      Head: Normocephalic and atraumatic. Eyes:      General: No scleral icterus. Neck:      Musculoskeletal: Neck supple. Cardiovascular:      Rate and Rhythm: Regular rhythm. Bradycardia present. Pulses: Normal pulses. Heart sounds: Normal heart sounds, S1 normal and S2 normal.   Pulmonary:      Effort: Pulmonary effort is normal. No respiratory distress. Breath sounds: Normal breath sounds. No wheezing, rhonchi or rales. Chest:      Chest wall: No mass, swelling or tenderness. Abdominal:      General: Bowel sounds are normal. There is no distension. Palpations: Abdomen is soft. Tenderness: There is no abdominal tenderness. There is no right CVA tenderness, left CVA tenderness, guarding or rebound. Hernia: No hernia is present. Musculoskeletal:      Right lower leg: No edema. Left lower leg: No edema. Skin:     General: Skin is warm and dry. Neurological:      Mental Status: He is alert and oriented to person, place, and time. Motor: No weakness. Gait: Gait normal.   Psychiatric:         Mood and Affect: Mood normal.         Behavior: Behavior normal. Behavior is cooperative. Visit Vitals  BP (!) 108/58 (BP 1 Location: Right arm, BP Patient Position: Sitting)   Pulse (!) 52   Temp 97.6 °F (36.4 °C) (Temporal)   Resp 16   Ht 5' 10\" (1.778 m)   Wt 185 lb (83.9 kg)   SpO2 100%   BMI 26.54 kg/m²         Disclaimer:  Advised him to call back or return to office if symptoms worsen/change/persist.  Discussed expected course/resolution/complications of diagnosis in detail with patient. Medication risks/benefits/costs/interactions/alternatives discussed with patient. He was given an after visit summary which includes diagnoses, current medications, & vitals. He expressed understanding and agrees with the diagnosis and plan.       MANNY Toro

## 2020-12-12 NOTE — PATIENT INSTRUCTIONS
Esophagitis: Care Instructions  Your Care Instructions     Esophagitis (say \"ih-sof-uh-JY-tus\") is irritation of the esophagus, the tube that carries food from your throat to your stomach. Acid reflux is the most common cause of this condition. When you have reflux, stomach acid and juices flow upward. This can cause pain or a burning feeling in your chest. You may have a sore throat. It may be hard to swallow. Other causes of this condition include some medicines and supplements. Allergies or an infection can also cause it. Your doctor will ask about your symptoms and past health. He or she might do tests to find the cause of your symptoms. Treatment depends on what is causing the problem. Treatment might include changing your diet or taking medicine to relieve your symptoms. It might also include changing a medicine that is causing your symptoms. If you have reflux, medicine that reduces the stomach acid helps your body heal. It might take 1 to 3 weeks to heal.  Follow-up care is a key part of your treatment and safety. Be sure to make and go to all appointments, and call your doctor if you are having problems. It's also a good idea to know your test results and keep a list of the medicines you take. How can you care for yourself at home? · If you have acid reflux, your doctor may recommend that you:  ? Eat several small meals instead of two or three large meals. After you eat, wait 2 to 3 hours before you lie down. ? Avoid chocolate, mint, alcohol, and spicy foods. ? Don't smoke or use smokeless tobacco. Smoking can make this condition worse. If you need help quitting, talk to your doctor about stop-smoking programs and medicines. These can increase your chances of quitting for good. ? Raise the head of your bed 6 to 8 inches if you have symptoms at night. ? Lose weight if you are overweight. ? Take an over-the-counter antacid, such as Maalox, Mylanta, or Tums.  Be careful when you take over-the-counter antacid medicines. Many of these medicines have aspirin in them. Read the label to make sure that you are not taking more than the recommended dose. Too much aspirin can be harmful. ? Take stronger acid reducers. Examples are famotidine (such as Pepcid) and omeprazole (such as Prilosec). · If your condition is caused by infection, allergy, or other problems, use the medicine or treatments that your doctor recommends. · Be safe with medicines. Take your medicines exactly as prescribed. Call your doctor if you think you are having a problem with your medicine. When should you call for help? Call your doctor now or seek immediate medical care if:    · You have new or worse belly pain.     · You are vomiting. Watch closely for changes in your health, and be sure to contact your doctor if:    · You have new or worse symptoms of reflux.     · You have trouble or pain swallowing.     · You are losing weight.     · You do not get better as expected. Where can you learn more? Go to http://www.gray.com/  Enter N977 in the search box to learn more about \"Esophagitis: Care Instructions. \"  Current as of: April 15, 2020               Content Version: 12.6  © 0403-9107 [employer]. Care instructions adapted under license by [employer] (which disclaims liability or warranty for this information). If you have questions about a medical condition or this instruction, always ask your healthcare professional. [place] [address] any warranty or liability for your use of this information.

## 2020-12-13 NOTE — TELEPHONE ENCOUNTER
Pt messaged on call that he thought he was having reaction to recent medication. I called pt back and spoke to both him and his wife. He states that he had noted being increasingly itchy past several days. Was seen yesterday in clinic for esophagitis and placed on Carafate and Prilosec. States chest pain/esophageal pain has subsided with treatment. Still feels as though he needs to drink following eating to fully pass food through. Today has noted rash on arms, legs, and heels. Single blister on left arm-like an ant bite. Heels hurt. Several joints slightly red and warm but do not hurt. He thinks rash is related to newly added Cialis-started on 11/11/2020. States had similar allergy to Sulfa drug which took about that long prior to noting allergy. Did not take Cialis today-once he noted rash worsening. Took Benadryl 50 mg about an hour ago-states itching has decreased. Denies difficulty breathing, fever, or other symptoms. Advised if any worsening to go to ER. Otherwise if continued rash and joint redness/warmth tomorrow morning to go to urgent care for further workup. Do not recommend oral steroids d/t esophagitis concern, but if needed he could have steroid injection. He will continue to hold Cialis for now. Pt/wife agreeable with plan.

## 2020-12-13 NOTE — TELEPHONE ENCOUNTER
Follow up call placed this morning to check on patient. He was out walking. States feels much better this morning. Slept well-did not have to take any further doses of Benadryl or seek any further treatment. Still has a slight rash on his wrists, but redness/heat/blistering/itching all resolved. Also stated no further chest/esophageal discomfort. Advised him to not take any more Cialis d/t likely allergy. He will contact Dr. Lindsay Flower office on Monday to make aware and find out if there is another treatment to consider for the post prostatectomy blood flow. He will finish the prescribed courses of Carafate and Prilosec-aware to contact our office for follow up if esophageal symptoms return.

## 2020-12-14 ENCOUNTER — OFFICE VISIT (OUTPATIENT)
Dept: INTERNAL MEDICINE CLINIC | Age: 55
End: 2020-12-14
Payer: COMMERCIAL

## 2020-12-14 ENCOUNTER — TELEPHONE (OUTPATIENT)
Dept: INTERNAL MEDICINE CLINIC | Age: 55
End: 2020-12-14

## 2020-12-14 VITALS
HEART RATE: 60 BPM | HEIGHT: 70 IN | RESPIRATION RATE: 14 BRPM | SYSTOLIC BLOOD PRESSURE: 124 MMHG | WEIGHT: 184 LBS | OXYGEN SATURATION: 98 % | BODY MASS INDEX: 26.34 KG/M2 | DIASTOLIC BLOOD PRESSURE: 65 MMHG | TEMPERATURE: 96.9 F

## 2020-12-14 DIAGNOSIS — C61 PROSTATE CANCER (HCC): ICD-10-CM

## 2020-12-14 DIAGNOSIS — K22.4 ESOPHAGEAL SPASM: ICD-10-CM

## 2020-12-14 DIAGNOSIS — L50.9 HIVES: Primary | ICD-10-CM

## 2020-12-14 PROCEDURE — 99213 OFFICE O/P EST LOW 20 MIN: CPT | Performed by: INTERNAL MEDICINE

## 2020-12-14 RX ORDER — FAMOTIDINE 20 MG/1
20 TABLET, FILM COATED ORAL 2 TIMES DAILY
Qty: 60 TAB | Refills: 0 | Status: SHIPPED | OUTPATIENT
Start: 2020-12-14 | End: 2021-01-05

## 2020-12-14 NOTE — PROGRESS NOTES
HPI:  Mily Oakley is a 60y.o. year old male who is here for hives that started at the end of last week. He noticed some itching on his hands last Thursday and on Friday he was seen here in the office for some painful swallowing and chest discomfort. At that point he was diagnosed with esophageal spasm and prescribed Prilosec and Carafate. His esophageal symptoms have improved but his hives have persisted. He has been on Cialis now for about a month from his urologist after prostate surgery. He stopped the Cialis 2 days ago and has been taking Zyrtec and Benadryl with limited success. No shortness of breath or wheeze. No difficulty swallowing. Past Medical History:   Diagnosis Date    Allergic rhinitis, cause unspecified     CAD (coronary artery disease)     Hives     Mixed hyperlipidemia 1/25/2010    Prostate cancer ([facility] [address] 8/21/2020       Past Surgical History:   Procedure Laterality Date    HX GI      colonoscopy    HX HEENT      WISDOM TEETH EXTRACTION     HX RADICAL PROSTATECTOMY  11/09/2020    lymph node dissection    HX VASECTOMY  1996    HX WISDOM TEETH EXTRACTION      [place] PROSTATE BIOPSY, NEEDLE, SATURATION SAMPLING  06/2019       Prior to Admission medications    Medication Sig Start Date End Date Taking? Authorizing Provider   famotidine (PEPCID) 20 mg tablet Take 1 Tab by mouth two (2) times a day. 12/14/20  Yes Jackie العلي MD   cholecalciferol (VITAMIN D3) (2,000 UNITS /50 MCG) cap capsule Take 2,000 Units by mouth daily. Yes Provider, Historical   docusate sodium (COLACE) 100 mg capsule Take 100 mg by mouth two (2) times a day. Yes Provider, Historical   sucralfate (Carafate) 1 gram tablet Take 1 Tab by mouth two (2) times a day for 10 days. 12/11/20 12/21/20 Yes MANNY Acuña   aspirin delayed-release 81 mg tablet Take  by mouth daily. Yes Provider, Historical   simvastatin (ZOCOR) 40 mg tablet Take 1 Tab by mouth nightly.  8/24/20  Yes Jessica Baldwin III, MD   cetirizine (ZYRTEC) 10 mg tablet Take 1 Tab by mouth daily. Provider, Historical   tadalafiL (CIALIS) 5 mg tablet Take 5 mg by mouth daily. 12/14/20  Provider, Historical   omeprazole (PRILOSEC) 40 mg capsule Take 1 Cap by mouth daily. 12/11/20 12/14/20  MANNY Phan       Social History     Socioeconomic History    Marital status:      Spouse name: Not on file    Number of children: Not on file    Years of education: Not on file    Highest education level: Not on file   Occupational History    Not on file   Social Needs    Financial resource strain: Not on file    Food insecurity     Worry: Not on file     Inability: Not on file    Transportation needs     Medical: Not on file     Non-medical: Not on file   Tobacco Use    Smoking status: Never Smoker    Smokeless tobacco: Never Used   Substance and Sexual Activity    Alcohol use:  Yes     Alcohol/week: 5.0 standard drinks     Types: 5 Glasses of wine per week     Frequency: 2-3 times a week     Drinks per session: 1 or 2     Binge frequency: Never    Drug use: No    Sexual activity: Yes     Partners: Female     Birth control/protection: Surgical   Lifestyle    Physical activity     Days per week: Not on file     Minutes per session: Not on file    Stress: Not on file   Relationships    Social connections     Talks on phone: Not on file     Gets together: Not on file     Attends Sabianist service: Not on file     Active member of club or organization: Not on file     Attends meetings of clubs or organizations: Not on file     Relationship status: Not on file    Intimate partner violence     Fear of current or ex partner: Not on file     Emotionally abused: Not on file     Physically abused: Not on file     Forced sexual activity: Not on file   Other Topics Concern    Not on file   Social History Narrative    Not on file          ROS  Per HPI    Visit Vitals  /65   Pulse 60   Temp 96.9 °F (36.1 °C) (Temporal) Resp 14   Ht 5' 10\" (1.778 m)   Wt 184 lb (83.5 kg)   SpO2 98%   BMI 26.40 kg/m²         Physical Exam   Physical Examination: General appearance - alert, well appearing, and in no distress  Chest - clear to auscultation, no wheezes, rales or rhonchi, symmetric air entry  Heart - normal rate and regular rhythm  Skin -has hives scattered across both arms and chest.      Assessment/Plan:  Diagnoses and all orders for this visit:    1. Hives-likely Cialis related. No other changes in his regimen. At this point will have him continue Zyrtec and Benadryl. We will also add Pepcid 20 mg twice a day. We will have him stop the Prilosec in case that is contributing to the problem. 2. Prostate cancer (HCC)-appears to be doing well following surgery. 3. Esophageal spasm-continue Carafate and add Pepcid and let me know if not resolving. Other orders  -     famotidine (PEPCID) 20 mg tablet; Take 1 Tab by mouth two (2) times a day. Advised him to call back or return to office if symptoms worsen/change/persist.  Discussed expected course/resolution/complications of diagnosis in detail with patient. Medication risks/benefits/costs/interactions/alternatives discussed with patient. He was given an after visit summary which includes diagnoses, current medications, & vitals. He expressed understanding with the diagnosis and plan.

## 2021-01-05 RX ORDER — FAMOTIDINE 20 MG/1
TABLET, FILM COATED ORAL
Qty: 60 TAB | Refills: 0 | Status: SHIPPED | OUTPATIENT
Start: 2021-01-05 | End: 2021-10-15 | Stop reason: ALTCHOICE

## 2021-05-26 RX ORDER — SIMVASTATIN 40 MG/1
TABLET, FILM COATED ORAL
Qty: 90 TABLET | Refills: 2 | Status: SHIPPED | OUTPATIENT
Start: 2021-05-26 | End: 2022-02-27

## 2021-09-24 LAB — PSA, EXTERNAL: <0.1

## 2021-10-15 ENCOUNTER — OFFICE VISIT (OUTPATIENT)
Dept: INTERNAL MEDICINE CLINIC | Age: 56
End: 2021-10-15
Payer: COMMERCIAL

## 2021-10-15 VITALS
SYSTOLIC BLOOD PRESSURE: 128 MMHG | HEART RATE: 54 BPM | DIASTOLIC BLOOD PRESSURE: 73 MMHG | WEIGHT: 194 LBS | HEIGHT: 70 IN | TEMPERATURE: 97.5 F | RESPIRATION RATE: 16 BRPM | BODY MASS INDEX: 27.77 KG/M2

## 2021-10-15 DIAGNOSIS — E78.2 MIXED HYPERLIPIDEMIA: ICD-10-CM

## 2021-10-15 DIAGNOSIS — Z00.00 ROUTINE MEDICAL EXAM: ICD-10-CM

## 2021-10-15 DIAGNOSIS — M16.11 ARTHRITIS OF RIGHT HIP: ICD-10-CM

## 2021-10-15 DIAGNOSIS — M75.02 ADHESIVE CAPSULITIS OF LEFT SHOULDER: ICD-10-CM

## 2021-10-15 DIAGNOSIS — C61 PROSTATE CANCER (HCC): ICD-10-CM

## 2021-10-15 DIAGNOSIS — J30.1 SEASONAL ALLERGIC RHINITIS DUE TO POLLEN: Primary | ICD-10-CM

## 2021-10-15 LAB
ALBUMIN SERPL-MCNC: 3.8 G/DL (ref 3.5–5)
ALBUMIN/GLOB SERPL: 1.2 {RATIO} (ref 1.1–2.2)
ALP SERPL-CCNC: 41 U/L (ref 45–117)
ALT SERPL-CCNC: 20 U/L (ref 12–78)
ANION GAP SERPL CALC-SCNC: 1 MMOL/L (ref 5–15)
AST SERPL-CCNC: 12 U/L (ref 15–37)
BASOPHILS # BLD: 0.1 K/UL (ref 0–0.1)
BASOPHILS NFR BLD: 1 % (ref 0–1)
BILIRUB SERPL-MCNC: 1 MG/DL (ref 0.2–1)
BUN SERPL-MCNC: 18 MG/DL (ref 6–20)
BUN/CREAT SERPL: 18 (ref 12–20)
CALCIUM SERPL-MCNC: 9.1 MG/DL (ref 8.5–10.1)
CHLORIDE SERPL-SCNC: 107 MMOL/L (ref 97–108)
CHOLEST SERPL-MCNC: 161 MG/DL
CO2 SERPL-SCNC: 29 MMOL/L (ref 21–32)
CREAT SERPL-MCNC: 1 MG/DL (ref 0.7–1.3)
DIFFERENTIAL METHOD BLD: NORMAL
EOSINOPHIL # BLD: 0.1 K/UL (ref 0–0.4)
EOSINOPHIL NFR BLD: 2 % (ref 0–7)
ERYTHROCYTE [DISTWIDTH] IN BLOOD BY AUTOMATED COUNT: 12.7 % (ref 11.5–14.5)
GLOBULIN SER CALC-MCNC: 3.1 G/DL (ref 2–4)
GLUCOSE SERPL-MCNC: 82 MG/DL (ref 65–100)
HCT VFR BLD AUTO: 49.2 % (ref 36.6–50.3)
HDLC SERPL-MCNC: 56 MG/DL
HDLC SERPL: 2.9 {RATIO} (ref 0–5)
HGB BLD-MCNC: 16.2 G/DL (ref 12.1–17)
IMM GRANULOCYTES # BLD AUTO: 0 K/UL (ref 0–0.04)
IMM GRANULOCYTES NFR BLD AUTO: 0 % (ref 0–0.5)
LDLC SERPL CALC-MCNC: 90.2 MG/DL (ref 0–100)
LYMPHOCYTES # BLD: 1.7 K/UL (ref 0.8–3.5)
LYMPHOCYTES NFR BLD: 33 % (ref 12–49)
MCH RBC QN AUTO: 31.1 PG (ref 26–34)
MCHC RBC AUTO-ENTMCNC: 32.9 G/DL (ref 30–36.5)
MCV RBC AUTO: 94.4 FL (ref 80–99)
MONOCYTES # BLD: 0.6 K/UL (ref 0–1)
MONOCYTES NFR BLD: 13 % (ref 5–13)
NEUTS SEG # BLD: 2.7 K/UL (ref 1.8–8)
NEUTS SEG NFR BLD: 51 % (ref 32–75)
NRBC # BLD: 0 K/UL (ref 0–0.01)
NRBC BLD-RTO: 0 PER 100 WBC
PLATELET # BLD AUTO: 239 K/UL (ref 150–400)
PMV BLD AUTO: 10.5 FL (ref 8.9–12.9)
POTASSIUM SERPL-SCNC: 4.6 MMOL/L (ref 3.5–5.1)
PROT SERPL-MCNC: 6.9 G/DL (ref 6.4–8.2)
RBC # BLD AUTO: 5.21 M/UL (ref 4.1–5.7)
SODIUM SERPL-SCNC: 137 MMOL/L (ref 136–145)
TRIGL SERPL-MCNC: 74 MG/DL (ref ?–150)
TSH SERPL DL<=0.05 MIU/L-ACNC: 1.47 UIU/ML (ref 0.36–3.74)
VLDLC SERPL CALC-MCNC: 14.8 MG/DL
WBC # BLD AUTO: 5.1 K/UL (ref 4.1–11.1)

## 2021-10-15 PROCEDURE — 99396 PREV VISIT EST AGE 40-64: CPT | Performed by: INTERNAL MEDICINE

## 2021-10-15 NOTE — PROGRESS NOTES
Subjective:     Simone Gonzales is a 60 y.o. male presenting for annual exam and complete physical.    Patient Active Problem List    Diagnosis Date Noted    Hives     Prostate cancer ([facility] 08/21/2020    Advance directive discussed with patient 05/26/2016    Mixed hyperlipidemia 01/25/2010    Allergic rhinitis due to pollen      Current Outpatient Medications   Medication Sig Dispense Refill    simvastatin (ZOCOR) 40 mg tablet TAKE 1 TABLET BY MOUTH EVERY DAY AT NIGHT 90 Tablet 2    cholecalciferol (VITAMIN D3) (2,000 UNITS /50 MCG) cap capsule Take 2,000 Units by mouth daily.  docusate sodium (COLACE) 100 mg capsule Take 100 mg by mouth daily.  cetirizine (ZYRTEC) 10 mg tablet Take 1 Tab by mouth daily.  aspirin delayed-release 81 mg tablet Take  by mouth daily.        Allergies   Allergen Reactions    Bactrim [Sulfamethoprim] Swelling    Cialis [Tadalafil] Hives     & heartburn      Past Medical History:   Diagnosis Date    Allergic rhinitis, cause unspecified     CAD (coronary artery disease)     Hives     Mixed hyperlipidemia 1/25/2010    Prostate cancer ([facility] 8/21/2020     Past Surgical History:   Procedure Laterality Date    HX GI      colonoscopy    HX HEENT      WISDOM TEETH EXTRACTION     HX ORTHOPAEDIC Left 12/2020    left hand sx - [facility]     HX RADICAL PROSTATECTOMY  11/09/2020    lymph node dissection    HX VASECTOMY  1996    HX WISDOM TEETH EXTRACTION      [place] PROSTATE BIOPSY, NEEDLE, SATURATION SAMPLING  06/2019     Family History   Problem Relation Age of Onset    Elevated Lipids Father     Cancer Paternal Grandmother         brain    Heart Disease Paternal Grandfather     Alcohol abuse Brother     Stroke Brother     Seizures Brother     Cancer Brother     Alcohol abuse Sister     Other Sister     Anesth Problems Daughter         AWAKENING DURING SURGERY ,NEEDED MORE MEDICATION     Coronary Artery Disease Paternal Uncle     Coronary Artery Disease Paternal Uncle      Social History     Tobacco Use    Smoking status: Never Smoker    Smokeless tobacco: Never Used   Substance Use Topics    Alcohol use: Yes     Alcohol/week: 4.0 standard drinks     Types: 4 Glasses of wine per week        Lab Results   Component Value Date/Time    WBC 5.7 10/30/2020 03:08 PM    HGB 11.6 (L) 11/11/2020 04:50 AM    HCT 35.0 (L) 11/11/2020 04:50 AM    PLATELET 791 22/83/7130 03:08 PM    MCV 93.0 10/30/2020 03:08 PM     Lab Results   Component Value Date/Time    Cholesterol, total 138 12/04/2020 07:44 AM    HDL Cholesterol 49 12/04/2020 07:44 AM    LDL, calculated 76 12/04/2020 07:44 AM    LDL, calculated 106 (H) 08/21/2020 02:26 PM    LDL-C, External 98 08/25/2018 12:00 AM    Triglyceride 61 12/04/2020 07:44 AM    CHOL/HDL Ratio 3.0 01/25/2010 09:54 AM     Lab Results   Component Value Date/Time    ALT (SGPT) 12 12/04/2020 07:46 AM    Alk.  phosphatase 50 12/04/2020 07:46 AM    Bilirubin, direct 0.19 12/04/2020 07:46 AM    Bilirubin, total 0.8 12/04/2020 07:46 AM    Albumin 4.3 12/04/2020 07:46 AM    Protein, total 6.1 12/04/2020 07:46 AM    INR 1.0 10/30/2020 03:08 PM    Prothrombin time 10.9 10/30/2020 03:08 PM    PLATELET 623 79/51/0686 03:08 PM     Lab Results   Component Value Date/Time    GFR est non-AA >60 10/30/2020 03:08 PM    GFR est AA >60 10/30/2020 03:08 PM    Creatinine 0.89 10/30/2020 03:08 PM    BUN 23 (H) 10/30/2020 03:08 PM    Sodium 140 10/30/2020 03:08 PM    Potassium 4.0 10/30/2020 03:08 PM    Chloride 106 10/30/2020 03:08 PM    CO2 27 10/30/2020 03:08 PM     Lab Results   Component Value Date/Time    Prostate Specific Ag 4.2 (H) 02/07/2019 11:36 AM    Prostate Specific Ag 3.6 02/01/2018 03:43 PM    Prostate Specific Ag 3.3 05/26/2016 09:26 AM    Prostate Specific Ag 1.2 01/25/2010 09:54 AM    Prostate Specific Ag 0.9 01/23/2009 10:47 AM     Lab Results   Component Value Date/Time    TSH 1.350 08/21/2020 02:26 PM    TSH 0.967 05/26/2016 09:26 AM Lab Results   Component Value Date/Time    Glucose 79 10/30/2020 03:08 PM         Review of Systems  Some decreased range of motion in the left shoulder. Some pain with rotation of the right hip. They are not limiting his activity. He recently saw urology for follow-up and his PSA was again 0. Objective:     Visit Vitals  /73   Pulse (!) 54   Temp 97.5 °F (36.4 °C) (Temporal)   Resp 16   Ht 5' 10\" (1.778 m)   Wt 194 lb (88 kg)   BMI 27.84 kg/m²     Physical exam:   General appearance - alert, well appearing, and in no distress  Ears - bilateral TM's and external ear canals normal  Nose - normal and patent, no erythema, discharge or polyps  Mouth - mucous membranes moist, pharynx normal without lesions  Neck - supple, no significant adenopathy  Lymphatics - no palpable lymphadenopathy, no hepatosplenomegaly  Chest - clear to auscultation, no wheezes, rales or rhonchi, symmetric air entry  Heart - normal rate, regular rhythm, normal S1, S2, no murmurs, rubs, clicks or gallops  Abdomen - soft, nontender, nondistended, no masses or organomegaly  Back exam - full range of motion, no tenderness, palpable spasm or pain on motion  Neurological - alert, oriented, normal speech, no focal findings or movement disorder noted  Musculoskeletal - no joint tenderness, deformity or swelling, abnormal exam of right hip with decreased range of motion and some pain in the groin with range of motion. , abnormal exam of left shoulder with some slight decreased range of motion. Minimal discomfort. Extremities - peripheral pulses normal, no pedal edema, no clubbing or cyanosis     Assessment/Plan:       lose weight, increase physical activity, routine labs ordered. Diagnoses and all orders for this visit:    1. Seasonal allergic rhinitis due to pollen-stable. 2. Prostate cancer (HCC)-in remission after surgery. -LDL goal of 100. Check labs to be sure that is met.     3. Mixed hyperlipidemia  -     LIPID PANEL; Future    4. Routine medical exam  -     METABOLIC PANEL, COMPREHENSIVE; Future  -     CBC WITH AUTOMATED DIFF; Future  -     LIPID PANEL; Future  -     TSH 3RD GENERATION; Future    5. Arthritis of right hip-restart yoga and follow-up as needed. 6. Adhesive capsulitis of left shoulder-work on stretching exercises and see orthopedics as needed. Duc Foster

## 2022-02-27 RX ORDER — SIMVASTATIN 40 MG/1
TABLET, FILM COATED ORAL
Qty: 90 TABLET | Refills: 2 | Status: SHIPPED | OUTPATIENT
Start: 2022-02-27

## 2022-03-19 PROBLEM — C61 PROSTATE CANCER (HCC): Status: ACTIVE | Noted: 2020-08-21

## 2022-08-18 ENCOUNTER — OFFICE VISIT (OUTPATIENT)
Dept: INTERNAL MEDICINE CLINIC | Age: 57
End: 2022-08-18
Payer: COMMERCIAL

## 2022-08-18 VITALS
SYSTOLIC BLOOD PRESSURE: 130 MMHG | RESPIRATION RATE: 14 BRPM | OXYGEN SATURATION: 96 % | DIASTOLIC BLOOD PRESSURE: 77 MMHG | BODY MASS INDEX: 29.78 KG/M2 | HEIGHT: 70 IN | TEMPERATURE: 97.5 F | WEIGHT: 208 LBS | HEART RATE: 51 BPM

## 2022-08-18 DIAGNOSIS — E78.2 MIXED HYPERLIPIDEMIA: ICD-10-CM

## 2022-08-18 DIAGNOSIS — Z00.00 ROUTINE MEDICAL EXAM: Primary | ICD-10-CM

## 2022-08-18 PROCEDURE — 99396 PREV VISIT EST AGE 40-64: CPT | Performed by: INTERNAL MEDICINE

## 2022-08-18 RX ORDER — SILDENAFIL CITRATE 20 MG/1
20 TABLET ORAL AS NEEDED
COMMUNITY

## 2022-08-18 NOTE — PROGRESS NOTES
Subjective:     Ines Cassidy is a 60 y.o. male presenting for annual exam and complete physical.    Patient Active Problem List    Diagnosis Date Noted    Hives     Prostate cancer ([facility] 08/21/2020    Advance directive discussed with patient 05/26/2016    Mixed hyperlipidemia 01/25/2010    Allergic rhinitis due to pollen      Current Outpatient Medications   Medication Sig Dispense Refill    sildenafiL (REVATIO) 20 mg tablet Take 20 mg by mouth as needed. simvastatin (ZOCOR) 40 mg tablet TAKE 1 TABLET BY MOUTH EVERY DAY AT NIGHT 90 Tablet 2    cholecalciferol (VITAMIN D3) (2,000 UNITS /50 MCG) cap capsule Take 2,000 Units by mouth daily. docusate sodium (COLACE) 100 mg capsule Take 100 mg by mouth daily. cetirizine (ZYRTEC) 10 mg tablet Take 1 Tab by mouth daily. aspirin delayed-release 81 mg tablet Take  by mouth daily.        Allergies   Allergen Reactions    Bactrim [Sulfamethoprim] Swelling    Cialis [Tadalafil] Hives     & heartburn      Past Medical History:   Diagnosis Date    Allergic rhinitis, cause unspecified     CAD (coronary artery disease)     Chronic obstructive pulmonary disease ([facility] Oct 2020    Hives     Mixed hyperlipidemia 01/25/2010    Prostate cancer ([facility] 08/21/2020     Past Surgical History:   Procedure Laterality Date    HX GI      colonoscopy    HX HEENT      WISDOM TEETH EXTRACTION     HX ORTHOPAEDIC Left 12/2020    left hand sx - [facility]     HX RADICAL PROSTATECTOMY  11/09/2020    lymph node dissection    HX VASECTOMY  1996    HX WISDOM TEETH EXTRACTION      [place] PROSTATE BIOPSY, NEEDLE, SATURATION SAMPLING  06/2019     Family History   Problem Relation Age of Onset    Elevated Lipids Father     Alcohol abuse Sister         1/2 sister    Other Sister     Alcohol abuse Sister     Alcohol abuse Brother     Stroke Brother     Seizures Brother     Cancer Brother         Testicular    Alcohol abuse Maternal Grandmother     Alcohol abuse Maternal Grandfather     Cancer Paternal Grandmother         Brain    Alcohol abuse Paternal Grandmother     Heart Disease Paternal Grandfather     Anesth Problems Daughter         AWAKENING DURING SURGERY ,NEEDED MORE MEDICATION     Coronary Art Dis Paternal Uncle     Heart Disease Paternal Uncle     Coronary Art Dis Paternal Uncle     Heart Disease Paternal Uncle      Social History     Tobacco Use    Smoking status: Never    Smokeless tobacco: Never   Substance Use Topics    Alcohol use: Yes     Alcohol/week: 5.0 standard drinks     Types: 5 Glasses of wine per week        Lab Results   Component Value Date/Time    WBC 5.1 10/15/2021 08:52 AM    HGB 16.2 10/15/2021 08:52 AM    HCT 49.2 10/15/2021 08:52 AM    PLATELET 072 60/40/1041 08:52 AM    MCV 94.4 10/15/2021 08:52 AM     Lab Results   Component Value Date/Time    Cholesterol, total 161 10/15/2021 08:52 AM    HDL Cholesterol 56 10/15/2021 08:52 AM    LDL, calculated 90.2 10/15/2021 08:52 AM    LDL-C, External 98 08/25/2018 12:00 AM    Triglyceride 74 10/15/2021 08:52 AM    CHOL/HDL Ratio 2.9 10/15/2021 08:52 AM     Lab Results   Component Value Date/Time    ALT (SGPT) 20 10/15/2021 08:52 AM    Alk.  phosphatase 41 (L) 10/15/2021 08:52 AM    Bilirubin, direct 0.19 12/04/2020 07:46 AM    Bilirubin, total 1.0 10/15/2021 08:52 AM    Albumin 3.8 10/15/2021 08:52 AM    Protein, total 6.9 10/15/2021 08:52 AM    INR 1.0 10/30/2020 03:08 PM    Prothrombin time 10.9 10/30/2020 03:08 PM    PLATELET 571 26/69/8969 08:52 AM       Lab Results   Component Value Date/Time    GFR est non-AA >60 10/15/2021 08:52 AM    GFR est AA >60 10/15/2021 08:52 AM    Creatinine 1.00 10/15/2021 08:52 AM    BUN 18 10/15/2021 08:52 AM    Sodium 137 10/15/2021 08:52 AM    Potassium 4.6 10/15/2021 08:52 AM    Chloride 107 10/15/2021 08:52 AM    CO2 29 10/15/2021 08:52 AM     Lab Results   Component Value Date/Time    Prostate Specific Ag 4.2 (H) 02/07/2019 11:36 AM    Prostate Specific Ag 3.6 02/01/2018 03:43 PM    Prostate Specific Ag 3.3 05/26/2016 09:26 AM    Prostate Specific Ag 1.2 01/25/2010 09:54 AM    Prostate Specific Ag 0.9 01/23/2009 10:47 AM     Lab Results   Component Value Date/Time    TSH 1.47 10/15/2021 08:52 AM      Lab Results   Component Value Date/Time    Glucose 82 10/15/2021 08:52 AM         Review of Systems  A comprehensive review of systems was negative except for: Weight is up slightly. He is concerned about his cardiovascular risk because of this. He has been exercising but has not been watching his diet as carefully. He is seeing urologist on a regular basis for his follow-up PSAs and just saw dermatology for a visit as well. Objective:     Visit Vitals  /77   Pulse (!) 51   Temp 97.5 °F (36.4 °C) (Temporal)   Resp 14   Ht 5' 10\" (1.778 m)   Wt 208 lb (94.3 kg)   SpO2 96%   BMI 29.84 kg/m²     Physical exam:   General appearance - alert, well appearing, and in no distress  Ears - bilateral TM's and external ear canals normal  Mouth - mucous membranes moist, pharynx normal without lesions  Neck - supple, no significant adenopathy  Lymphatics - no palpable lymphadenopathy, no hepatosplenomegaly  Chest - clear to auscultation, no wheezes, rales or rhonchi, symmetric air entry  Heart - normal rate, regular rhythm, normal S1, S2, no murmurs, rubs, clicks or gallops  Abdomen - soft, nontender, nondistended, no masses or organomegaly  Neurological - alert, oriented, normal speech, no focal findings or movement disorder noted  Musculoskeletal - no joint tenderness, deformity or swelling  Extremities - peripheral pulses normal, no pedal edema, no clubbing or cyanosis     Assessment/Plan:       lose weight, bring BP log to office visit, follow low fat diet, follow low salt diet, routine labs ordered. Diagnoses and all orders for this visit:    1. Routine medical exam  -     METABOLIC PANEL, COMPREHENSIVE; Future  -     CBC WITH AUTOMATED DIFF; Future  -     LIPID PANEL;  Future  -     TSH 3RD GENERATION; Future    2. Mixed hyperlipidemia-did do a risk calculator. His current 10-year risk is 5.5%. This could improved to 4.6% if he got better control of his blood pressure. Advised him to increase his work on weight loss and will follow-up blood pressure in the future. -     LIPID PANEL; Future  3. Prostate cancer-Per urology.

## 2022-11-27 RX ORDER — SIMVASTATIN 40 MG/1
TABLET, FILM COATED ORAL
Qty: 90 TABLET | Refills: 2 | Status: SHIPPED | OUTPATIENT
Start: 2022-11-27

## 2023-01-19 ENCOUNTER — OFFICE VISIT (OUTPATIENT)
Dept: CARDIOLOGY CLINIC | Age: 58
End: 2023-01-19
Payer: COMMERCIAL

## 2023-01-19 VITALS
HEART RATE: 54 BPM | DIASTOLIC BLOOD PRESSURE: 60 MMHG | RESPIRATION RATE: 16 BRPM | SYSTOLIC BLOOD PRESSURE: 118 MMHG | BODY MASS INDEX: 29.49 KG/M2 | HEIGHT: 70 IN | WEIGHT: 206 LBS | OXYGEN SATURATION: 98 %

## 2023-01-19 DIAGNOSIS — E78.2 MIXED HYPERLIPIDEMIA: ICD-10-CM

## 2023-01-19 DIAGNOSIS — R93.1 AGATSTON CAC SCORE 100-199: Primary | ICD-10-CM

## 2023-01-19 NOTE — Clinical Note
2/12/2023    Patient: Jocelyn Herman   YOB: 1965   Date of Visit: 1/19/2023     Yahaira Benjamin, [address] [facility]  [address]  [address] [place] [address]  Essence Craven    Dear Yahaira Benjamin MD,      Thank you for referring Mr. Tova Regalado to [address] [place] [address] for evaluation. My notes for this consultation are attached. If you have questions, please do not hesitate to call me. I look forward to following your patient along with you.       Sincerely,    Eliecer Buenrostro MD

## 2023-01-19 NOTE — PATIENT INSTRUCTIONS
You have been scheduled for a stress echocardiogram. Please arrive 15 minutes prior to your appointment. Wear comfortable clothes and shoes. Please do not eat or drink anything other than water two hours prior to test. We will send results via [employer]. Next Coronary Calcium Score is 2025 - call us at [phone].

## 2023-02-12 PROBLEM — R93.1 AGATSTON CAC SCORE 100-199: Status: ACTIVE | Noted: 2023-02-12

## 2023-02-12 NOTE — PROGRESS NOTES
Manuel Swift     1965       Murray Padilal MD, [facility] [place]  Date of Visit-01/19/2023   PCP is Lawyer Frankie MD   [facility] [place]  [facility]  HPI:  Manuel Swift is a 59 y.o. male   Patient seen last in cardiology October 2020 for elevated coronary calcium score of 146 in the left main and LAD. He underwent stress echocardiogram and simvastatin dose was increased to 40 mg at that time. Medical history include prostate cancer, erectile dysfunction, and vitamin D deficiency. Stress echo done 10/23/2020 was negative at stage IV. Calcium score 8/26/2020 of 146 at the 60th percentile  Today. .  Notes mild TRUJILLO  Denies chest pain, edema, syncope or shortness of breath at rest   Has no tachycardia , palpitations or sense of arrythmia    EKG: Sinus bradycardia within normal limits  Key CAD CHF Meds               simvastatin (ZOCOR) 40 mg tablet (Taking) TAKE 1 TABLET BY MOUTH EVERY DAY AT NIGHT    aspirin delayed-release 81 mg tablet (Taking) Take  by mouth daily. Lab Results   Component Value Date/Time    LDL, calculated 79.2 09/07/2022 07:13 AM      Assessment/Plan:     Patient Instructions   You have been scheduled for a stress echocardiogram. Please arrive 15 minutes prior to your appointment. Wear comfortable clothes and shoes. Please do not eat or drink anything other than water two hours prior to test. We will send results via [employer]. Next Coronary Calcium Score is 2025 - call us at [phone].      60th percentile calcium score  We discussed his risk factors and prior calcium score. It is elevated and treat on the percentile not just the absolute number. Statin therapy is more than appropriate.   He has coronary atherosclerosis due to plaque but it is unknown if that is intraluminal or extraluminal.  His previous stress echo was normal.  It is reasonable with TRUJILLO to get a stress echo now I told him to get a calcium score  at least 5 years apart from each other to be of value of seeing if there is progression of disease. A calcium score at 60 percentile and I think 8 LDL on therapy at 79 is reasonable. This places him below the 85 goal and very close to the primary goal of 70. Impression:   1. Agatston CAC score 100-199    2. Mixed hyperlipidemia       Cardiac History:   No specialty comments available. Future Appointments   Date Time Provider Sherin Garcia   3/8/2023 11:00 AM ARTHUR MAURICE BS AMB   3/8/2023 11:00 AM ARTHUR BUCHANAN  AMB      Patient Care Team:  Erinn Jeffery MD as PCP - General  Erinn Jeffery MD as PCP - [facility] Empaneled Provider  Destiney Paulino MD (Urology)  Bridger Arvizu MD (Cardiovascular Disease Physician)   ROS-except as noted above. . A complete cardiac and respiratory are reviewed and negative except as above ; Resp-denies wheezing  or productive cough,. Const- No unusual weight loss or fever; Neuro-no recent seizure or CVA ; GI- No BRBPR, abdom pain, bloating ; - no  hematuria   see supplement sheet, initialed and to be scanned by staff    Past Medical History:   Diagnosis Date    Allergic rhinitis, cause unspecified     CAD (coronary artery disease)     Chronic obstructive pulmonary disease ([address] Oct 2020    Hives     Mixed hyperlipidemia 01/25/2010    Prostate cancer ([facility] 08/21/2020      Social Hx= reports that he has never smoked. He has never used smokeless tobacco. He reports current alcohol use of about 5.0 standard drinks per week. He reports that he does not use drugs. Family Hx- family history includes Alcohol abuse in his brother, maternal grandfather, maternal grandmother, paternal grandmother, sister, and sister;  Anesth Problems in his daughter; Cancer in his brother and paternal grandmother; Coronary Art Dis in his paternal uncle and paternal uncle; Elevated Lipids in his father; Heart Disease in his paternal grandfather, paternal uncle, and paternal uncle; Other in his sister; Seizures in his brother; Stroke in his brother. Allergies   Allergen Reactions    Bactrim [Sulfamethoprim] Swelling    Cialis [Tadalafil] Hives     & heartburn         Exam and Labs:  Visit Vitals  /60   Pulse (!) 54   Resp 16   Ht 5' 10\" (1.778 m)   Wt 206 lb (93.4 kg)   SpO2 98%   BMI 29.56 kg/m²    @Constitutional:  NAD, comfortable  Head: NC,AT. Eyes: No scleral icterus. Neck:  Neck supple. No JVD present. Throat: moist mucous membranes. Chest: Effort normal & normal respiratory excursion . Neurological: alert, conversant and oriented . Skin: Skin is not cold. No obvious systemic rash noted. Not diaphoretic. No erythema. Psychiatric:  Grossly normal mood and affect. Behavior appears normal. Extremities:  no clubbing or cyanosis. Abdomen: non distended    Lungs:breath sounds normal. No stridor. distress, wheezes or  Rales. Heart:    normal rate, regular rhythm, normal S1, S2, no murmurs, rubs, clicks or gallops , PMI non displaced. Edema: Edema is none.       Lab Results   Component Value Date/Time    Cholesterol, total 147 09/07/2022 07:13 AM    HDL Cholesterol 49 09/07/2022 07:13 AM    LDL, calculated 79.2 09/07/2022 07:13 AM    Triglyceride 94 09/07/2022 07:13 AM    CHOL/HDL Ratio 3.0 09/07/2022 07:13 AM     Lab Results   Component Value Date/Time    Sodium 139 09/07/2022 07:13 AM    Potassium 4.7 09/07/2022 07:13 AM    Chloride 106 09/07/2022 07:13 AM    CO2 27 09/07/2022 07:13 AM    Anion gap 6 09/07/2022 07:13 AM    Glucose 88 09/07/2022 07:13 AM    BUN 19 09/07/2022 07:13 AM    Creatinine 0.96 09/07/2022 07:13 AM    BUN/Creatinine ratio 20 09/07/2022 07:13 AM    GFR est AA >60 09/07/2022 07:13 AM    GFR est non-AA >60 09/07/2022 07:13 AM    Calcium 8.6 09/07/2022 07:13 AM      Wt Readings from Last 3 Encounters:   01/19/23 206 lb (93.4 kg)   08/18/22 208 lb (94.3 kg)   10/15/21 194 lb (88 kg)      BP Readings from Last 3 Encounters:   01/19/23 118/60   08/18/22 130/77   10/15/21 128/73      Current Outpatient Medications   Medication Sig    simvastatin (ZOCOR) 40 mg tablet TAKE 1 TABLET BY MOUTH EVERY DAY AT NIGHT    sildenafiL (REVATIO) 20 mg tablet Take 20 mg by mouth as needed. cholecalciferol (VITAMIN D3) (2,000 UNITS /50 MCG) cap capsule Take 2,000 Units by mouth daily. docusate sodium (COLACE) 100 mg capsule Take 100 mg by mouth daily. cetirizine (ZYRTEC) 10 mg tablet Take 1 Tab by mouth daily. aspirin delayed-release 81 mg tablet Take  by mouth daily. No current facility-administered medications for this visit. Impression see above.

## 2023-06-01 ENCOUNTER — TELEPHONE (OUTPATIENT)
Age: 58
End: 2023-06-01

## 2023-06-01 DIAGNOSIS — R93.1 ABNORMAL FINDINGS ON DIAGNOSTIC IMAGING OF HEART AND CORONARY CIRCULATION: Primary | ICD-10-CM

## 2023-06-05 NOTE — TELEPHONE ENCOUNTER
Spoke to Mr. Cholo Kerns last week and let him know he should call [facility] to schedule (gave him number) and changed order to ancillary. He was appreciative.

## 2023-06-06 ENCOUNTER — HOSPITAL ENCOUNTER (OUTPATIENT)
Facility: HOSPITAL | Age: 58
Discharge: HOME | End: 2023-06-08
Attending: SPECIALIST
Payer: COMMERCIAL

## 2023-06-06 VITALS — HEIGHT: 70 IN | BODY MASS INDEX: 29.35 KG/M2 | WEIGHT: 205 LBS

## 2023-06-06 DIAGNOSIS — R93.1 ABNORMAL FINDINGS ON DIAGNOSTIC IMAGING OF HEART AND CORONARY CIRCULATION: ICD-10-CM

## 2023-06-06 LAB
ECHO BSA: 2.14 M2
ECHO TV REGURGITANT MAX VELOCITY: 2.08 M/S
ECHO TV REGURGITANT PEAK GRADIENT: 17 MMHG
EKG DIAGNOSIS: NORMAL
STRESS BASELINE DIAS BP: 80 MMHG
STRESS BASELINE HR: 41 BPM
STRESS BASELINE ST DEPRESSION: 0 MM
STRESS BASELINE SYS BP: 138 MMHG
STRESS ESTIMATED WORKLOAD: 12.5 METS
STRESS PEAK DIAS BP: 74 MMHG
STRESS PEAK SYS BP: 175 MMHG
STRESS PERCENT HR ACHIEVED: 101 %
STRESS POST PEAK HR: 162 BPM
STRESS RATE PRESSURE PRODUCT: NORMAL BPM*MMHG
STRESS ST DEPRESSION: 1.8 MM
STRESS STAGE 1 BP: NORMAL MMHG
STRESS STAGE 1 DURATION: 3 MIN:SEC
STRESS STAGE 1 HR: 84 BPM
STRESS STAGE 2 DURATION: 3 MIN:SEC
STRESS STAGE 2 HR: 111 BPM
STRESS STAGE 3 DURATION: 3 MIN:SEC
STRESS STAGE 3 HR: 141 BPM
STRESS STAGE 4 DURATION: NORMAL MIN:SEC
STRESS STAGE 4 HR: 162 BPM
STRESS STAGE RECOVERY 1 DURATION: 1 MIN:SEC
STRESS STAGE RECOVERY 1 HR: 137 BPM
STRESS STAGE RECOVERY 2 BP: NORMAL MMHG
STRESS STAGE RECOVERY 2 DURATION: 1 MIN:SEC
STRESS STAGE RECOVERY 2 HR: 106 BPM
STRESS STAGE RECOVERY 3 DURATION: 1 MIN:SEC
STRESS STAGE RECOVERY 3 HR: 83 BPM
STRESS STAGE RECOVERY 4 BP: NORMAL MMHG
STRESS STAGE RECOVERY 4 DURATION: 1 MIN:SEC
STRESS STAGE RECOVERY 4 HR: 70 BPM
STRESS TARGET HR: 161 BPM

## 2023-06-06 PROCEDURE — 93320 DOPPLER ECHO COMPLETE: CPT | Performed by: SPECIALIST

## 2023-06-06 PROCEDURE — 93016 CV STRESS TEST SUPVJ ONLY: CPT | Performed by: SPECIALIST

## 2023-06-06 PROCEDURE — 93018 CV STRESS TEST I&R ONLY: CPT | Performed by: SPECIALIST

## 2023-06-06 PROCEDURE — 93350 STRESS TTE ONLY: CPT | Performed by: SPECIALIST

## 2023-06-06 PROCEDURE — 93017 CV STRESS TEST TRACING ONLY: CPT

## 2023-06-14 ENCOUNTER — OFFICE VISIT (OUTPATIENT)
Age: 58
End: 2023-06-14
Payer: COMMERCIAL

## 2023-06-14 VITALS
DIASTOLIC BLOOD PRESSURE: 78 MMHG | HEIGHT: 70 IN | BODY MASS INDEX: 29.35 KG/M2 | SYSTOLIC BLOOD PRESSURE: 120 MMHG | WEIGHT: 205 LBS | HEART RATE: 46 BPM | OXYGEN SATURATION: 98 % | RESPIRATION RATE: 16 BRPM

## 2023-06-14 DIAGNOSIS — E78.2 MIXED HYPERLIPIDEMIA: ICD-10-CM

## 2023-06-14 DIAGNOSIS — R93.1 ABNORMAL FINDINGS ON DIAGNOSTIC IMAGING OF HEART AND CORONARY CIRCULATION: ICD-10-CM

## 2023-06-14 DIAGNOSIS — R93.1 AGATSTON CAC SCORE 100-199: Primary | ICD-10-CM

## 2023-06-14 PROCEDURE — 93000 ELECTROCARDIOGRAM COMPLETE: CPT | Performed by: SPECIALIST

## 2023-06-14 PROCEDURE — 99214 OFFICE O/P EST MOD 30 MIN: CPT | Performed by: SPECIALIST

## 2023-08-21 RX ORDER — SIMVASTATIN 40 MG
TABLET ORAL
Qty: 90 TABLET | Refills: 2 | Status: SHIPPED | OUTPATIENT
Start: 2023-08-21

## 2023-08-28 NOTE — PROGRESS NOTES
PRN) 06/06/2023  3:35 PM (Final)    Interpretation Summary    Study Impression: pobably Consistent with mild proximal to mid inferior ischemia. suboptimal acoustic windows reduce accuacy of echocardiographic interpetation    Post-stress Echo: The post-stress echo showed new wall motion abnormalities compared to baseline which are indicative of myocardial ischemia. Mild hypokinesis of the following segments: basal inferior and mid inferior. Left Ventricle: Low normal left ventricular systolic function with a visually estimated EF of 50 - 55%. Left ventricle size is normal. Normal wall thickness. Normal wall motion. ECG: Resting ECG demonstrates sinus bradycardia. ECG: The ECG was positive for ischemia. Stress Test: A Landen protocol stress test was performed. Blood pressure demonstrated a normal response and heart rate demonstrated a normal response to stress. The patient's heart rate recovery was normal. The patient reported no symptoms during the stress test.    Stress ECG: The ECG was positive for ischemia. Signed by: Trace Iglesias MD on 6/6/2023  3:35 PM     No results found for this or any previous visit. Patient Care Team:  Annabella Kendrick MD as PCP - Janee Jerry MD as PCP - Empaneled Provider  Ana Alvares MD as Consulting Physician (Cardiology)   ROS-except as noted above. . A complete cardiac and respiratory are reviewed and negative except as above ; Resp-denies wheezing  or productive cough,. Const- No unusual weight loss or fever; Neuro-no recent seizure or CVA ; GI- No BRBPR, abdom pain, bloating ; - no  hematuria   see supplement sheet, initialed and to be scanned by staff      Social Hx= reports that he has never smoked. He has never used smokeless tobacco. He reports current alcohol use of about 5.0 standard drinks per week. He reports that he does not use drugs.    Family Hx- family history includes Alcohol Abuse in his brother, maternal grandfather, maternal

## 2024-05-29 RX ORDER — SIMVASTATIN 40 MG
TABLET ORAL
Qty: 90 TABLET | Refills: 0 | Status: SHIPPED | OUTPATIENT
Start: 2024-05-29

## 2024-05-29 NOTE — TELEPHONE ENCOUNTER
Chief Complaint   Patient presents with    Medication Refill     Last Appointment with Dr. Jose Alejandro Avina:  8/18/2022   Future Appointments   Date Time Provider Department Center   6/13/2024  8:40 AM Davida Brenner MD [facility] AMB   6/19/2024  8:00 AM Jose Alejandro Avina MD [facility] AMB      Including heroin both subcutaneous and IV along with smoking history of 20+ pack years  -Cessation counseling  -Nicotine replacement therapy  -Follow-up with [facility] outpatient

## 2024-06-13 ENCOUNTER — OFFICE VISIT (OUTPATIENT)
Age: 59
End: 2024-06-13
Payer: COMMERCIAL

## 2024-06-13 VITALS
BODY MASS INDEX: 29.06 KG/M2 | DIASTOLIC BLOOD PRESSURE: 66 MMHG | WEIGHT: 203 LBS | HEIGHT: 70 IN | OXYGEN SATURATION: 100 % | SYSTOLIC BLOOD PRESSURE: 136 MMHG | HEART RATE: 52 BPM

## 2024-06-13 DIAGNOSIS — I25.83 CORONARY ARTERY DISEASE DUE TO LIPID RICH PLAQUE: ICD-10-CM

## 2024-06-13 DIAGNOSIS — R94.39 ABNORMAL STRESS ECHOCARDIOGRAM: Primary | ICD-10-CM

## 2024-06-13 DIAGNOSIS — R93.1 ABNORMAL FINDINGS ON DIAGNOSTIC IMAGING OF HEART AND CORONARY CIRCULATION: ICD-10-CM

## 2024-06-13 DIAGNOSIS — I25.10 CORONARY ARTERY DISEASE DUE TO LIPID RICH PLAQUE: ICD-10-CM

## 2024-06-13 DIAGNOSIS — E78.2 MIXED HYPERLIPIDEMIA: ICD-10-CM

## 2024-06-13 DIAGNOSIS — R93.1 AGATSTON CAC SCORE 100-199: ICD-10-CM

## 2024-06-13 PROCEDURE — 93000 ELECTROCARDIOGRAM COMPLETE: CPT | Performed by: SPECIALIST

## 2024-06-13 PROCEDURE — 99214 OFFICE O/P EST MOD 30 MIN: CPT | Performed by: SPECIALIST

## 2024-06-13 NOTE — PATIENT INSTRUCTIONS
Call [phone] to schedule your Coronary CTA. Send a [employer] message to Dr. Brenner once you have a date and we will see you back a week or two after the test.

## 2024-06-13 NOTE — PROGRESS NOTES
wheezes or  Rales.  Heart:    normal rate, regular rhythm, normal S1, S2, no murmurs, rubs, clicks or gallops , PMI non displaced.    Edema: Edema is none.      Current Outpatient Medications:     MAGNESIUM PO, Take by mouth, Disp: , Rfl:     simvastatin (ZOCOR) 40 MG tablet, TAKE 1 TABLET BY MOUTH EVERY DAY AT NIGHT, Disp: 90 tablet, Rfl: 0    aspirin 81 MG EC tablet, Take by mouth daily, Disp: , Rfl:     cetirizine (ZYRTEC) 10 MG tablet, Take 1 tablet by mouth daily, Disp: , Rfl:     Cholecalciferol 50 MCG (2000 UT) CAPS, Take 1,000 Units by mouth daily, Disp: , Rfl:     docusate (COLACE, DULCOLAX) 100 MG CAPS, Take by mouth daily, Disp: , Rfl:     sildenafil (REVATIO) 20 MG tablet, Take by mouth as needed, Disp: , Rfl:    Impression see above.

## 2024-06-14 ENCOUNTER — PATIENT MESSAGE (OUTPATIENT)
Age: 59
End: 2024-06-14

## 2024-06-14 NOTE — TELEPHONE ENCOUNTER
From: Rickey Swartz  To: Dr. Davida Brenner  Sent: 6/14/2024 3:14 PM EDT  Subject: Coronary CTA    I have the CTA scheduled for 7/24 8am at [place] . [facility] Scheduling says I need a prescription for a beta blocker to be taken the night before. Is this something you can call into the [facility] I have on file?

## 2024-06-19 ENCOUNTER — OFFICE VISIT (OUTPATIENT)
Age: 59
End: 2024-06-19
Payer: COMMERCIAL

## 2024-06-19 VITALS
HEIGHT: 70 IN | TEMPERATURE: 98 F | BODY MASS INDEX: 29.32 KG/M2 | SYSTOLIC BLOOD PRESSURE: 121 MMHG | WEIGHT: 204.8 LBS | OXYGEN SATURATION: 97 % | DIASTOLIC BLOOD PRESSURE: 71 MMHG | HEART RATE: 52 BPM | RESPIRATION RATE: 15 BRPM

## 2024-06-19 DIAGNOSIS — M79.10 MYALGIA: ICD-10-CM

## 2024-06-19 DIAGNOSIS — R93.1 AGATSTON CAC SCORE 100-199: ICD-10-CM

## 2024-06-19 DIAGNOSIS — Z00.00 ENCOUNTER FOR WELL ADULT EXAM WITHOUT ABNORMAL FINDINGS: ICD-10-CM

## 2024-06-19 DIAGNOSIS — C61 PROSTATE CANCER (HCC): ICD-10-CM

## 2024-06-19 DIAGNOSIS — E55.9 VITAMIN D DEFICIENCY: ICD-10-CM

## 2024-06-19 DIAGNOSIS — Z00.00 ENCOUNTER FOR WELL ADULT EXAM WITHOUT ABNORMAL FINDINGS: Primary | ICD-10-CM

## 2024-06-19 DIAGNOSIS — Z12.11 SCREENING FOR COLON CANCER: ICD-10-CM

## 2024-06-19 DIAGNOSIS — E78.2 MIXED HYPERLIPIDEMIA: ICD-10-CM

## 2024-06-19 LAB
25(OH)D3 SERPL-MCNC: 32.3 NG/ML (ref 30–100)
ALBUMIN SERPL-MCNC: 3.9 G/DL (ref 3.5–5)
ALBUMIN/GLOB SERPL: 1.3 (ref 1.1–2.2)
ALP SERPL-CCNC: 41 U/L (ref 45–117)
ALT SERPL-CCNC: 38 U/L (ref 12–78)
ANION GAP SERPL CALC-SCNC: 3 MMOL/L (ref 5–15)
AST SERPL-CCNC: 43 U/L (ref 15–37)
BASOPHILS # BLD: 0.1 K/UL (ref 0–0.1)
BASOPHILS NFR BLD: 1 % (ref 0–1)
BILIRUB SERPL-MCNC: 1.3 MG/DL (ref 0.2–1)
BUN SERPL-MCNC: 15 MG/DL (ref 6–20)
BUN/CREAT SERPL: 13 (ref 12–20)
CALCIUM SERPL-MCNC: 9.2 MG/DL (ref 8.5–10.1)
CHLORIDE SERPL-SCNC: 107 MMOL/L (ref 97–108)
CHOLEST SERPL-MCNC: 176 MG/DL
CK SERPL-CCNC: 488 U/L (ref 39–308)
CO2 SERPL-SCNC: 28 MMOL/L (ref 21–32)
CREAT SERPL-MCNC: 1.12 MG/DL (ref 0.7–1.3)
DIFFERENTIAL METHOD BLD: ABNORMAL
EOSINOPHIL # BLD: 0 K/UL (ref 0–0.4)
EOSINOPHIL NFR BLD: 1 % (ref 0–7)
ERYTHROCYTE [DISTWIDTH] IN BLOOD BY AUTOMATED COUNT: 13 % (ref 11.5–14.5)
GLOBULIN SER CALC-MCNC: 3.1 G/DL (ref 2–4)
GLUCOSE SERPL-MCNC: 99 MG/DL (ref 65–100)
HCT VFR BLD AUTO: 49.1 % (ref 36.6–50.3)
HDLC SERPL-MCNC: 57 MG/DL
HDLC SERPL: 3.1 (ref 0–5)
HGB BLD-MCNC: 15.9 G/DL (ref 12.1–17)
IMM GRANULOCYTES # BLD AUTO: 0 K/UL (ref 0–0.04)
IMM GRANULOCYTES NFR BLD AUTO: 1 % (ref 0–0.5)
LDLC SERPL CALC-MCNC: 102.8 MG/DL (ref 0–100)
LYMPHOCYTES # BLD: 1.3 K/UL (ref 0.8–3.5)
LYMPHOCYTES NFR BLD: 21 % (ref 12–49)
MCH RBC QN AUTO: 30.2 PG (ref 26–34)
MCHC RBC AUTO-ENTMCNC: 32.4 G/DL (ref 30–36.5)
MCV RBC AUTO: 93.2 FL (ref 80–99)
MONOCYTES # BLD: 0.6 K/UL (ref 0–1)
MONOCYTES NFR BLD: 10 % (ref 5–13)
NEUTS SEG # BLD: 4.4 K/UL (ref 1.8–8)
NEUTS SEG NFR BLD: 66 % (ref 32–75)
NRBC # BLD: 0 K/UL (ref 0–0.01)
NRBC BLD-RTO: 0 PER 100 WBC
PLATELET # BLD AUTO: 258 K/UL (ref 150–400)
PMV BLD AUTO: 10.8 FL (ref 8.9–12.9)
POTASSIUM SERPL-SCNC: 4.8 MMOL/L (ref 3.5–5.1)
PROT SERPL-MCNC: 7 G/DL (ref 6.4–8.2)
RBC # BLD AUTO: 5.27 M/UL (ref 4.1–5.7)
SODIUM SERPL-SCNC: 138 MMOL/L (ref 136–145)
TRIGL SERPL-MCNC: 81 MG/DL
TSH SERPL DL<=0.05 MIU/L-ACNC: 1.47 UIU/ML (ref 0.36–3.74)
VLDLC SERPL CALC-MCNC: 16.2 MG/DL
WBC # BLD AUTO: 6.5 K/UL (ref 4.1–11.1)

## 2024-06-19 PROCEDURE — 99396 PREV VISIT EST AGE 40-64: CPT | Performed by: INTERNAL MEDICINE

## 2024-06-19 SDOH — ECONOMIC STABILITY: HOUSING INSECURITY
IN THE LAST 12 MONTHS, WAS THERE A TIME WHEN YOU DID NOT HAVE A STEADY PLACE TO SLEEP OR SLEPT IN A SHELTER (INCLUDING NOW)?: NO

## 2024-06-19 SDOH — ECONOMIC STABILITY: FOOD INSECURITY: WITHIN THE PAST 12 MONTHS, THE FOOD YOU BOUGHT JUST DIDN'T LAST AND YOU DIDN'T HAVE MONEY TO GET MORE.: NEVER TRUE

## 2024-06-19 SDOH — ECONOMIC STABILITY: FOOD INSECURITY: WITHIN THE PAST 12 MONTHS, YOU WORRIED THAT YOUR FOOD WOULD RUN OUT BEFORE YOU GOT MONEY TO BUY MORE.: NEVER TRUE

## 2024-06-19 SDOH — ECONOMIC STABILITY: INCOME INSECURITY: HOW HARD IS IT FOR YOU TO PAY FOR THE VERY BASICS LIKE FOOD, HOUSING, MEDICAL CARE, AND HEATING?: NOT HARD AT ALL

## 2024-06-19 ASSESSMENT — PATIENT HEALTH QUESTIONNAIRE - PHQ9
SUM OF ALL RESPONSES TO PHQ QUESTIONS 1-9: 0
2. FEELING DOWN, DEPRESSED OR HOPELESS: NOT AT ALL
SUM OF ALL RESPONSES TO PHQ QUESTIONS 1-9: 0
SUM OF ALL RESPONSES TO PHQ9 QUESTIONS 1 & 2: 0
1. LITTLE INTEREST OR PLEASURE IN DOING THINGS: NOT AT ALL

## 2024-06-19 NOTE — PROGRESS NOTES
dissection    VASECTOMY  1996    WISDOM TOOTH EXTRACTION           Family History   Problem Relation Age of Onset    Alcohol Abuse Brother     Stroke Brother     Seizures Brother     Cancer Brother         Testicular    Elevated Lipids Father     Other Sister     Alcohol Abuse Sister         1/2 sister    Alcohol Abuse Maternal Grandmother     Alcohol Abuse Maternal Grandfather     Cancer Paternal Grandmother         Brain    Alcohol Abuse Paternal Grandmother     Heart Disease Paternal Grandfather     Anesth Problems Daughter         AWAKENING DURING SURGERY ,NEEDED MORE MEDICATION     Coronary Art Dis Paternal Uncle     Heart Disease Paternal Uncle     Coronary Art Dis Paternal Uncle     Heart Disease Paternal Uncle     Alcohol Abuse Sister        Social History     Tobacco Use    Smoking status: Never    Smokeless tobacco: Never   Substance Use Topics    Alcohol use: Yes     Alcohol/week: 5.0 standard drinks of alcohol     Types: 3 Glasses of wine, 2 Cans of beer per week    Drug use: Never       Objective     Vital Signs  /71   Pulse 52   Temp 98 °F (36.7 °C)   Resp 15   Ht 1.778 m (5' 10\")   Wt 92.9 kg (204 lb 12.8 oz)   SpO2 97%   BMI 29.39 kg/m²   Wt Readings from Last 3 Encounters:   06/19/24 92.9 kg (204 lb 12.8 oz)   06/13/24 92.1 kg (203 lb)   06/14/23 93 kg (205 lb)       Waist Circumference  There were no vitals filed for this visit.    Physical Exam  Physical Examination: General appearance - alert, well appearing, and in no distress  Mental status - alert, oriented to person, place, and time  Ears - bilateral TM's and external ear canals normal  Nose - normal and patent, no erythema, discharge or polyps  Mouth - mucous membranes moist, pharynx normal without lesions  Neck - supple, no significant adenopathy  Lymphatics - no palpable lymphadenopathy, no hepatosplenomegaly  Chest - clear to auscultation, no wheezes, rales or rhonchi, symmetric air entry  Heart - normal rate, regular rhythm,

## 2024-07-05 ENCOUNTER — PATIENT MESSAGE (OUTPATIENT)
Age: 59
End: 2024-07-05

## 2024-07-05 DIAGNOSIS — M79.10 MYALGIA: Primary | ICD-10-CM

## 2024-07-24 ENCOUNTER — HOSPITAL ENCOUNTER (OUTPATIENT)
Facility: HOSPITAL | Age: 59
Discharge: HOME | End: 2024-07-27
Attending: SPECIALIST
Payer: COMMERCIAL

## 2024-07-24 VITALS — HEART RATE: 47 BPM | SYSTOLIC BLOOD PRESSURE: 132 MMHG | RESPIRATION RATE: 14 BRPM | DIASTOLIC BLOOD PRESSURE: 62 MMHG

## 2024-07-24 DIAGNOSIS — E78.2 MIXED HYPERLIPIDEMIA: ICD-10-CM

## 2024-07-24 DIAGNOSIS — I25.10 CORONARY ARTERY DISEASE DUE TO LIPID RICH PLAQUE: ICD-10-CM

## 2024-07-24 DIAGNOSIS — R93.1 AGATSTON CAC SCORE 100-199: ICD-10-CM

## 2024-07-24 DIAGNOSIS — I25.83 CORONARY ARTERY DISEASE DUE TO LIPID RICH PLAQUE: ICD-10-CM

## 2024-07-24 DIAGNOSIS — R94.39 ABNORMAL STRESS ECHOCARDIOGRAM: ICD-10-CM

## 2024-07-24 PROCEDURE — 6360000004 HC RX CONTRAST MEDICATION: Performed by: SPECIALIST

## 2024-07-24 PROCEDURE — 6370000000 HC RX 637 (ALT 250 FOR IP): Performed by: INTERNAL MEDICINE

## 2024-07-24 PROCEDURE — 75574 CT ANGIO HRT W/3D IMAGE: CPT

## 2024-07-24 RX ORDER — NITROGLYCERIN 0.4 MG/1
0.8 TABLET SUBLINGUAL EVERY 5 MIN PRN
Status: DISCONTINUED | OUTPATIENT
Start: 2024-07-24 | End: 2024-07-28 | Stop reason: HOSPADM

## 2024-07-24 RX ADMIN — IOPAMIDOL 80 ML: 755 INJECTION, SOLUTION INTRAVENOUS at 08:38

## 2024-07-24 RX ADMIN — NITROGLYCERIN 0.8 MG: 0.4 TABLET SUBLINGUAL at 08:33

## 2024-07-24 NOTE — PROGRESS NOTES
Patient brought back from the waiting room in preparation of CT Coronary scan.    Patient was not prescribed oral beta blocker protocol to be taken at home prior to the exam.    20 gauge diffusics IV initiated in the right AC.  Brisk blood return.      POC lab studies were not drawn.    Current vitals are as follows:    /68  HR 47    Called CT at 0832 to verify if they are ready for the patient    Nitroglycerin 0.8mg given to patient at 0834    Taken to CT via wheelchair at 0835    Test completed, vitals after exam are as follows:    /62  HR 47    Patients IV removed by CT tech, and patient discharged.  Denies dizziness upon arising.    Carlene Gramajo RN

## 2024-08-25 RX ORDER — SIMVASTATIN 40 MG
TABLET ORAL
Qty: 90 TABLET | Refills: 0 | Status: SHIPPED | OUTPATIENT
Start: 2024-08-25

## 2025-01-11 ASSESSMENT — RHEUMATOLOGY NEW PATIENT QUESTIONNAIRE
JOINT SWELLING: N
MUSCLE WEAKNESS: N
FEVER: N
SORES IN MOUTH OR NOSE: N
RASH OR ULCERS: N
NIGHT SWEATS: N
PERSISTENT DIARRHEA: N
SHORTNESS OF BREATH: N
DIFFICULTY SWALLOWING: N
STOMACH PAIN: N
NUMBNESS OR TINGLING IN HANDS OR FEET: N
UNEXPLAINED WEIGHT CHANGE: N
DIFFICULTY STAYING ASLEEP: N
HOARSE VOICE: N
ABNORMAL URINE: N
COUGH: N
EASY BRUISING: N
COLOR CHANGES OF HANDS OR FEET IN THE COLD: N
LOSS OF CONSCIOUSNESS: N
HEARTBURN OR REFLUX: N
NAUSEA: N
HEADACHES: N
ANXIETY: N
DRYNESS OF MOUTH: N
SEIZURES: N
INCREASED SUSCEPTIBILITY TO INFECTION: N
SKIN REDNESS: N
BEHAVIORAL CHANGES: N
EASILY LOSING TEMPER: N
UNUSUAL BLEEDING: N
DOUBLE OR BLURRED VISION: N
DEPRESSION: N
JAUNDICE: N
UNUSUAL FATIGUE: N
VOMITING OF BLOOD OR COFFEE GROUND CONSISTENCY MATERIAL: N
EYE PAIN: N
MORNING STIFFNESS: N
SWOLLEN LEGS OR FEET: N
SUN SENSITIVE (SUN ALLERGY): N
RASH: N
DIFFICULTY FALLING ASLEEP: N
DIFFICULTY BREATHING LYING DOWN: N
MEMORY LOSS: N
JOINT PAIN: N
LOSS OF VISION: N
ANEMIA: N
BLOOD IN STOOLS: N
CHEST PAIN: N
UNUSUALLY RAPID OR SLOWED HEART RATE: N
SWOLLEN OR TENDER GLANDS: N
EYE DRYNESS: N
FAINTING: N
PAIN OR BURNING ON URINATION: N
EXCESSIVE HAIR LOSS (MORE THAN YOUR NORM): N
SKIN TIGHTNESS: N
AGITATION: N
UNEXPLAINED HEARING LOSS: N
EYE REDNESS: N
BLACK STOOLS: N

## 2025-01-13 ENCOUNTER — OFFICE VISIT (OUTPATIENT)
Age: 60
End: 2025-01-13
Payer: COMMERCIAL

## 2025-01-13 VITALS
HEART RATE: 63 BPM | WEIGHT: 214.8 LBS | RESPIRATION RATE: 16 BRPM | OXYGEN SATURATION: 97 % | SYSTOLIC BLOOD PRESSURE: 153 MMHG | DIASTOLIC BLOOD PRESSURE: 72 MMHG | TEMPERATURE: 97 F | BODY MASS INDEX: 30.82 KG/M2

## 2025-01-13 DIAGNOSIS — H04.129 DRY EYE: ICD-10-CM

## 2025-01-13 DIAGNOSIS — M77.12 LATERAL EPICONDYLITIS OF BOTH ELBOWS: ICD-10-CM

## 2025-01-13 DIAGNOSIS — M25.561 CHRONIC PAIN OF BOTH KNEES: ICD-10-CM

## 2025-01-13 DIAGNOSIS — M77.11 LATERAL EPICONDYLITIS OF BOTH ELBOWS: ICD-10-CM

## 2025-01-13 DIAGNOSIS — M25.552 LEFT HIP PAIN: ICD-10-CM

## 2025-01-13 DIAGNOSIS — M25.572 CHRONIC PAIN OF LEFT ANKLE: ICD-10-CM

## 2025-01-13 DIAGNOSIS — M79.10 MYALGIA: Primary | ICD-10-CM

## 2025-01-13 DIAGNOSIS — G89.29 CHRONIC PAIN OF BOTH KNEES: ICD-10-CM

## 2025-01-13 DIAGNOSIS — G89.29 CHRONIC PAIN OF LEFT ANKLE: ICD-10-CM

## 2025-01-13 DIAGNOSIS — M25.562 CHRONIC PAIN OF BOTH KNEES: ICD-10-CM

## 2025-01-13 PROCEDURE — 99204 OFFICE O/P NEW MOD 45 MIN: CPT | Performed by: NURSE PRACTITIONER

## 2025-01-13 ASSESSMENT — ENCOUNTER SYMPTOMS
BLOOD IN STOOL: 0
SHORTNESS OF BREATH: 0
TROUBLE SWALLOWING: 0
SORE THROAT: 0
DIARRHEA: 0
CONSTIPATION: 0
ABDOMINAL PAIN: 0
EYE PAIN: 0
NAUSEA: 0
VOMITING: 0
COLOR CHANGE: 0
COUGH: 0
EYE REDNESS: 0

## 2025-01-13 ASSESSMENT — PATIENT HEALTH QUESTIONNAIRE - PHQ9
SUM OF ALL RESPONSES TO PHQ QUESTIONS 1-9: 0
SUM OF ALL RESPONSES TO PHQ9 QUESTIONS 1 & 2: 0
SUM OF ALL RESPONSES TO PHQ QUESTIONS 1-9: 0
1. LITTLE INTEREST OR PLEASURE IN DOING THINGS: NOT AT ALL
2. FEELING DOWN, DEPRESSED OR HOPELESS: NOT AT ALL

## 2025-01-13 NOTE — PROGRESS NOTES
Rickey Swartz (:  1965) is a 59 y.o. male      Subjective   HPI  The patient is a 59 year old male being seen at the request of Dr. Jose Alejandro Avina for the evaluation of myalgia. He reports increased muscle soreness after yard work or a walk for a couple of miles. He also reports as he's aged he's had increased joint pain in his left hip, eblows (exercise induced during row house), left ankle and knees. He denies swollen joints. He denies morning stiffness. He said he stopped taking statin for 2 weeks with no decrease in muscle pain. He states he drinks enough water to make his urine a pale yellow. He denies a family history of rheumatoid arthritis, lupus or gout.  He denies recent infections or antibiotics.    Review of Systems   Constitutional:  Negative for chills and fever.   HENT:  Negative for mouth sores, sore throat and trouble swallowing.         Denies nasals sores  Denies dry mouth   Eyes:  Negative for pain and redness.        Reports dry eyes in the winter   Respiratory:  Negative for cough and shortness of breath.    Cardiovascular:  Negative for chest pain.   Gastrointestinal:  Negative for abdominal pain, blood in stool, constipation, diarrhea, nausea and vomiting.   Genitourinary:  Negative for dysuria and hematuria.   Musculoskeletal:  Positive for arthralgias and myalgias.   Skin:  Negative for color change and rash.     Current Outpatient Medications   Medication Sig Dispense Refill    rosuvastatin (CRESTOR) 10 MG tablet Take 1 tablet by mouth nightly 90 tablet 3    MAGNESIUM PO Take by mouth      aspirin 81 MG EC tablet Take by mouth daily      cetirizine (ZYRTEC) 10 MG tablet Take 1 tablet by mouth daily      Cholecalciferol 50 MCG ( UT) CAPS Take 1,000 Units by mouth daily      docusate (COLACE, DULCOLAX) 100 MG CAPS Take by mouth daily      sildenafil (REVATIO) 20 MG tablet Take by mouth as needed       No current facility-administered medications for this visit.     Allergies

## 2025-01-13 NOTE — PROGRESS NOTES
Chief Complaint   Patient presents with    New Patient     1. Have you been to the ER, urgent care clinic since your last visit?  Hospitalized since your last visit?No    2. Have you seen or consulted any other health care providers outside of the [facility] since your last visit?  Include any pap smears or colon screening. No

## 2025-01-15 LAB
ALDOLASE SERPL-CCNC: 5.8 U/L (ref 3.3–10.3)
CCP IGA+IGG SERPL IA-ACNC: 3 UNITS (ref 0–19)
ENA RNP AB SER-ACNC: <0.2 AI (ref 0–0.9)
ENA SM AB SER-ACNC: <0.2 AI (ref 0–0.9)
ENA SS-A AB SER-ACNC: 0.3 AI (ref 0–0.9)
ENA SS-B AB SER-ACNC: <0.2 AI (ref 0–0.9)

## 2025-01-16 LAB
-: NORMAL
25(OH)D3 SERPL-MCNC: 42.9 NG/ML (ref 30–100)
ALBUMIN SERPL-MCNC: 4.1 G/DL (ref 3.5–5)
ALBUMIN/GLOB SERPL: 1.4 (ref 1.1–2.2)
ALP SERPL-CCNC: 48 U/L (ref 45–117)
ALT SERPL-CCNC: 44 U/L (ref 12–78)
ANION GAP SERPL CALC-SCNC: 4 MMOL/L (ref 2–12)
AST SERPL-CCNC: 17 U/L (ref 15–37)
BASOPHILS # BLD: 0.08 K/UL (ref 0–0.1)
BASOPHILS NFR BLD: 1.2 % (ref 0–1)
BILIRUB SERPL-MCNC: 0.8 MG/DL (ref 0.2–1)
BUN SERPL-MCNC: 16 MG/DL (ref 6–20)
BUN/CREAT SERPL: 14 (ref 12–20)
CALCIUM SERPL-MCNC: 9.2 MG/DL (ref 8.5–10.1)
CHLORIDE SERPL-SCNC: 106 MMOL/L (ref 97–108)
CK SERPL-CCNC: 58 U/L (ref 39–308)
CO2 SERPL-SCNC: 30 MMOL/L (ref 21–32)
CREAT SERPL-MCNC: 1.14 MG/DL (ref 0.7–1.3)
CRP SERPL-MCNC: <0.29 MG/DL (ref 0–0.3)
DIFFERENTIAL METHOD BLD: ABNORMAL
EOSINOPHIL # BLD: 0.14 K/UL (ref 0–0.4)
EOSINOPHIL NFR BLD: 2.1 % (ref 0–7)
ERYTHROCYTE [DISTWIDTH] IN BLOOD BY AUTOMATED COUNT: 12.8 % (ref 11.5–14.5)
ERYTHROCYTE [SEDIMENTATION RATE] IN BLOOD: 2 MM/HR (ref 0–20)
GLOBULIN SER CALC-MCNC: 2.9 G/DL (ref 2–4)
GLUCOSE SERPL-MCNC: 85 MG/DL (ref 65–100)
HAV IGM SER QL: NONREACTIVE
HBV CORE IGM SER QL: NONREACTIVE
HBV SURFACE AG SER QL: <0.1 INDEX
HBV SURFACE AG SER QL: NEGATIVE
HCT VFR BLD AUTO: 50.5 % (ref 36.6–50.3)
HCV AB SER IA-ACNC: 0.12 INDEX
HCV AB SERPL QL IA: NONREACTIVE
HGB BLD-MCNC: 16.4 G/DL (ref 12.1–17)
IMM GRANULOCYTES # BLD AUTO: 0.02 K/UL (ref 0–0.04)
IMM GRANULOCYTES NFR BLD AUTO: 0.3 % (ref 0–0.5)
LDH SERPL L TO P-CCNC: 172 U/L (ref 85–241)
LYMPHOCYTES # BLD: 2.1 K/UL (ref 0.8–3.5)
LYMPHOCYTES NFR BLD: 30.8 % (ref 12–49)
MAGNESIUM SERPL-MCNC: 2.5 MG/DL (ref 1.6–2.4)
MCH RBC QN AUTO: 30.3 PG (ref 26–34)
MCHC RBC AUTO-ENTMCNC: 32.5 G/DL (ref 30–36.5)
MCV RBC AUTO: 93.3 FL (ref 80–99)
MONOCYTES # BLD: 0.56 K/UL (ref 0–1)
MONOCYTES NFR BLD: 8.2 % (ref 5–13)
NEUTS SEG # BLD: 3.91 K/UL (ref 1.8–8)
NEUTS SEG NFR BLD: 57.4 % (ref 32–75)
NRBC # BLD: 0 K/UL (ref 0–0.01)
NRBC BLD-RTO: 0 PER 100 WBC
PLATELET # BLD AUTO: 241 K/UL (ref 150–400)
PMV BLD AUTO: 10.7 FL (ref 8.9–12.9)
POTASSIUM SERPL-SCNC: 4.5 MMOL/L (ref 3.5–5.1)
PROT SERPL-MCNC: 7 G/DL (ref 6.4–8.2)
RBC # BLD AUTO: 5.41 M/UL (ref 4.1–5.7)
SODIUM SERPL-SCNC: 140 MMOL/L (ref 136–145)
URATE SERPL-MCNC: 5.6 MG/DL (ref 3.5–7.2)
WBC # BLD AUTO: 6.8 K/UL (ref 4.1–11.1)

## 2025-01-18 LAB
ANA SPECKLED TITR SER: ABNORMAL {TITER}
ANA TITR SER IF: POSITIVE
NOTE: ABNORMAL
RHEUMATOID FACTOR: <14 IU/ML

## 2025-08-23 DIAGNOSIS — I25.83 CORONARY ARTERY DISEASE DUE TO LIPID RICH PLAQUE: ICD-10-CM

## 2025-08-23 DIAGNOSIS — I25.10 CORONARY ARTERY DISEASE DUE TO LIPID RICH PLAQUE: ICD-10-CM

## 2025-08-23 DIAGNOSIS — E78.2 MIXED HYPERLIPIDEMIA: ICD-10-CM

## 2025-08-26 RX ORDER — ROSUVASTATIN CALCIUM 10 MG/1
10 TABLET, COATED ORAL NIGHTLY
Qty: 90 TABLET | Refills: 0 | Status: SHIPPED | OUTPATIENT
Start: 2025-08-26

## 2025-09-02 ENCOUNTER — OFFICE VISIT (OUTPATIENT)
Age: 60
End: 2025-09-02
Payer: COMMERCIAL

## 2025-09-02 VITALS
HEART RATE: 62 BPM | DIASTOLIC BLOOD PRESSURE: 80 MMHG | RESPIRATION RATE: 16 BRPM | WEIGHT: 217 LBS | HEIGHT: 70 IN | BODY MASS INDEX: 31.07 KG/M2 | OXYGEN SATURATION: 97 % | SYSTOLIC BLOOD PRESSURE: 120 MMHG

## 2025-09-02 DIAGNOSIS — I25.10 CORONARY ARTERY DISEASE DUE TO LIPID RICH PLAQUE: Primary | ICD-10-CM

## 2025-09-02 DIAGNOSIS — I25.83 CORONARY ARTERY DISEASE DUE TO LIPID RICH PLAQUE: Primary | ICD-10-CM

## 2025-09-02 DIAGNOSIS — E78.2 MIXED HYPERLIPIDEMIA: ICD-10-CM

## 2025-09-02 DIAGNOSIS — R93.1 AGATSTON CORONARY ARTERY CALCIUM SCORE BETWEEN 100 AND 400: ICD-10-CM

## 2025-09-02 DIAGNOSIS — R93.1 ABNORMAL FINDINGS ON DIAGNOSTIC IMAGING OF HEART AND CORONARY CIRCULATION: ICD-10-CM

## 2025-09-02 PROCEDURE — 93000 ELECTROCARDIOGRAM COMPLETE: CPT | Performed by: SPECIALIST

## 2025-09-02 PROCEDURE — 99214 OFFICE O/P EST MOD 30 MIN: CPT | Performed by: SPECIALIST

## 2025-09-02 ASSESSMENT — PATIENT HEALTH QUESTIONNAIRE - PHQ9
SUM OF ALL RESPONSES TO PHQ QUESTIONS 1-9: 0
SUM OF ALL RESPONSES TO PHQ QUESTIONS 1-9: 0
2. FEELING DOWN, DEPRESSED OR HOPELESS: NOT AT ALL
SUM OF ALL RESPONSES TO PHQ QUESTIONS 1-9: 0
SUM OF ALL RESPONSES TO PHQ QUESTIONS 1-9: 0
1. LITTLE INTEREST OR PLEASURE IN DOING THINGS: NOT AT ALL

## (undated) DEVICE — TRAY PREP DRY W/ PREM GLV 2 APPL 6 SPNG 2 UNDPD 1 OVERWRAP

## (undated) DEVICE — SOLUTION IV 1000ML 0.9% SOD CHL

## (undated) DEVICE — STERILE POLYISOPRENE POWDER-FREE SURGICAL GLOVES WITH EMOLLIENT COATING: Brand: PROTEXIS

## (undated) DEVICE — SPONGE LAP 18X18IN STRL -- 5/PK

## (undated) DEVICE — SUT CHRMC 0 27IN CT1 BRN --

## (undated) DEVICE — SOLUTION IRRIG 1000ML H2O STRL BLT

## (undated) DEVICE — ZINACTIVE USE 2641837 CLIP LIG M BLU TI HRT SHP WIRE HORZ 600 PER BX

## (undated) DEVICE — INFECTION CONTROL KIT SYS

## (undated) DEVICE — BONE WAX WHITE: Brand: BONE WAX WHITE

## (undated) DEVICE — Device

## (undated) DEVICE — TOWEL SURG W17XL27IN STD BLU COT NONFENESTRATED PREWASHED

## (undated) DEVICE — NEEDLE HYPO 22GA L1.5IN BLK S STL HUB POLYPR SHLD REG BVL

## (undated) DEVICE — PENCIL SMK EVAC 10 FT BLADE ELECTRD ROCKER FOR TELSCP

## (undated) DEVICE — DBD-PACK,LAPAROTOMY,2 REINFORCED GOWNS: Brand: MEDLINE

## (undated) DEVICE — REM POLYHESIVE ADULT PATIENT RETURN ELECTRODE: Brand: VALLEYLAB

## (undated) DEVICE — GOWN,SIRUS,NONRNF,SETINSLV,XL,20/CS: Brand: MEDLINE

## (undated) DEVICE — ROCKER SWITCH PENCIL BLADE ELECTRODE, HOLSTER: Brand: EDGE

## (undated) DEVICE — SPONGE GZ W4XL4IN COT 12 PLY TYP VII WVN C FLD DSGN

## (undated) DEVICE — SURGICAL PROCEDURE PACK BASIN MAJ SET CUST NO CAUT

## (undated) DEVICE — TIP SUC YANK OPN W/O CTRL VNT

## (undated) DEVICE — DRAIN SURG 19FR 0.25IN SIL RND W/ TRCR INDIC DOT RADPQ FULL

## (undated) DEVICE — CATHETER,FOLEY,SILI-ELAST,LTX,20FR,10ML: Brand: MEDLINE

## (undated) DEVICE — RESERVOIR,SUCTION,100CC,SILICONE: Brand: MEDLINE

## (undated) DEVICE — DRAINBAG,ANTI-REFLUX TOWER,L/F,2000ML,LL: Brand: MEDLINE

## (undated) DEVICE — STRAP,POSITIONING,KNEE/BODY,FOAM,4X60": Brand: MEDLINE

## (undated) DEVICE — SUTURE CHROMIC GUT SZ 2-0 L27IN ABSRB BRN L26MM SH 1/2 CIR G123H

## (undated) DEVICE — CATHETER,FOLEY,SILI-ELAST,LTX,22FR,10ML: Brand: MEDLINE

## (undated) DEVICE — SYR LR LCK 1ML GRAD NSAF 30ML --

## (undated) DEVICE — GARMENT,MEDLINE,DVT,INT,CALF,MED, GEN2: Brand: MEDLINE

## (undated) DEVICE — SUTURE VCRL SZ 3-0 L27IN ABSRB VLT L26MM SH 1/2 CIR J316H

## (undated) DEVICE — SUTURE PERMAHAND SZ 2-0 L18IN NONABSORBABLE BLK L26MM PS 1588H

## (undated) DEVICE — SUT CHRMC 3-0 27IN SH BRN --

## (undated) DEVICE — SUT CHRMC 4-0 27IN RB1 BRN --

## (undated) DEVICE — SUTURE VCRL SZ 1 L36IN ABSRB VLT L48MM CTX 1/2 CIR J371H

## (undated) DEVICE — SUTURE CHROMIC GUT SZ 2-0 L27IN ABSRB BRN L26MM UR-6 5/8 N878H

## (undated) DEVICE — BIPOLAR FORCEPS CORD: Brand: VALLEYLAB